# Patient Record
Sex: FEMALE | Race: WHITE | NOT HISPANIC OR LATINO | URBAN - METROPOLITAN AREA
[De-identification: names, ages, dates, MRNs, and addresses within clinical notes are randomized per-mention and may not be internally consistent; named-entity substitution may affect disease eponyms.]

---

## 2023-08-18 ENCOUNTER — HOSPITAL ENCOUNTER (INPATIENT)
Facility: HOSPITAL | Age: 23
LOS: 6 days | Discharge: HOME | End: 2023-08-24
Attending: OBSTETRICS & GYNECOLOGY | Admitting: OBSTETRICS & GYNECOLOGY
Payer: COMMERCIAL

## 2023-08-18 ENCOUNTER — PREP FOR SURGERY (OUTPATIENT)
Dept: OTHER | Facility: HOSPITAL | Age: 23
End: 2023-08-18
Payer: COMMERCIAL

## 2023-08-18 DIAGNOSIS — Z98.891 S/P PRIMARY LOW TRANSVERSE C-SECTION: Primary | ICD-10-CM

## 2023-08-18 DIAGNOSIS — O13.3 GESTATIONAL HYPERTENSION, THIRD TRIMESTER: Primary | ICD-10-CM

## 2023-08-18 DIAGNOSIS — O13.3 GESTATIONAL HYPERTENSION, THIRD TRIMESTER: ICD-10-CM

## 2023-08-18 PROBLEM — Z34.93 THIRD TRIMESTER PREGNANCY: Status: ACTIVE | Noted: 2023-08-18

## 2023-08-18 LAB
ABO GROUP BLD: NORMAL
ALP SERPL-CCNC: 165 U/L (ref 39–117)
ALT SERPL W P-5'-P-CCNC: 6 U/L (ref 1–33)
AST SERPL-CCNC: 12 U/L (ref 1–32)
BILIRUB SERPL-MCNC: 0.2 MG/DL (ref 0–1.2)
BLD GP AB SCN SERPL QL: NEGATIVE
CREAT SERPL-MCNC: 0.44 MG/DL (ref 0.57–1)
DEPRECATED RDW RBC AUTO: 41.7 FL (ref 37–54)
ERYTHROCYTE [DISTWIDTH] IN BLOOD BY AUTOMATED COUNT: 13.6 % (ref 12.3–15.4)
EXPIRATION DATE: ABNORMAL
HCT VFR BLD AUTO: 36.2 % (ref 34–46.6)
HGB BLD-MCNC: 12.2 G/DL (ref 12–15.9)
LDH SERPL-CCNC: 159 U/L (ref 135–214)
Lab: ABNORMAL
MCH RBC QN AUTO: 28.6 PG (ref 26.6–33)
MCHC RBC AUTO-ENTMCNC: 33.7 G/DL (ref 31.5–35.7)
MCV RBC AUTO: 84.8 FL (ref 79–97)
PLATELET # BLD AUTO: 264 10*3/MM3 (ref 140–450)
PMV BLD AUTO: 10.7 FL (ref 6–12)
PROT UR STRIP-MCNC: ABNORMAL MG/DL
RBC # BLD AUTO: 4.27 10*6/MM3 (ref 3.77–5.28)
RH BLD: POSITIVE
T&S EXPIRATION DATE: NORMAL
URATE SERPL-MCNC: 4.6 MG/DL (ref 2.4–5.7)
WBC NRBC COR # BLD: 15.45 10*3/MM3 (ref 3.4–10.8)

## 2023-08-18 PROCEDURE — 85027 COMPLETE CBC AUTOMATED: CPT | Performed by: OBSTETRICS & GYNECOLOGY

## 2023-08-18 PROCEDURE — 81002 URINALYSIS NONAUTO W/O SCOPE: CPT | Performed by: OBSTETRICS & GYNECOLOGY

## 2023-08-18 PROCEDURE — 82247 BILIRUBIN TOTAL: CPT | Performed by: OBSTETRICS & GYNECOLOGY

## 2023-08-18 PROCEDURE — 84550 ASSAY OF BLOOD/URIC ACID: CPT | Performed by: OBSTETRICS & GYNECOLOGY

## 2023-08-18 PROCEDURE — 86900 BLOOD TYPING SEROLOGIC ABO: CPT | Performed by: OBSTETRICS & GYNECOLOGY

## 2023-08-18 PROCEDURE — 82565 ASSAY OF CREATININE: CPT | Performed by: OBSTETRICS & GYNECOLOGY

## 2023-08-18 PROCEDURE — G0378 HOSPITAL OBSERVATION PER HR: HCPCS

## 2023-08-18 PROCEDURE — 86901 BLOOD TYPING SEROLOGIC RH(D): CPT | Performed by: OBSTETRICS & GYNECOLOGY

## 2023-08-18 PROCEDURE — 87081 CULTURE SCREEN ONLY: CPT | Performed by: OBSTETRICS & GYNECOLOGY

## 2023-08-18 PROCEDURE — 84075 ASSAY ALKALINE PHOSPHATASE: CPT | Performed by: OBSTETRICS & GYNECOLOGY

## 2023-08-18 PROCEDURE — 84450 TRANSFERASE (AST) (SGOT): CPT | Performed by: OBSTETRICS & GYNECOLOGY

## 2023-08-18 PROCEDURE — 84460 ALANINE AMINO (ALT) (SGPT): CPT | Performed by: OBSTETRICS & GYNECOLOGY

## 2023-08-18 PROCEDURE — 86850 RBC ANTIBODY SCREEN: CPT | Performed by: OBSTETRICS & GYNECOLOGY

## 2023-08-18 PROCEDURE — 83615 LACTATE (LD) (LDH) ENZYME: CPT | Performed by: OBSTETRICS & GYNECOLOGY

## 2023-08-18 RX ORDER — CITRIC ACID/SODIUM CITRATE 334-500MG
15 SOLUTION, ORAL ORAL ONCE
Status: DISCONTINUED | OUTPATIENT
Start: 2023-08-18 | End: 2023-08-20 | Stop reason: SDUPTHER

## 2023-08-18 RX ORDER — SODIUM CHLORIDE 0.9 % (FLUSH) 0.9 %
10 SYRINGE (ML) INJECTION AS NEEDED
Status: CANCELLED | OUTPATIENT
Start: 2023-08-18

## 2023-08-18 RX ORDER — ONDANSETRON 4 MG/1
4 TABLET, FILM COATED ORAL EVERY 8 HOURS PRN
Status: CANCELLED | OUTPATIENT
Start: 2023-08-18

## 2023-08-18 RX ORDER — NIFEDIPINE 30 MG/1
30 TABLET, EXTENDED RELEASE ORAL
Status: CANCELLED | OUTPATIENT
Start: 2023-08-18

## 2023-08-18 RX ORDER — DIPHENHYDRAMINE HCL 25 MG
25 CAPSULE ORAL EVERY 4 HOURS PRN
Status: CANCELLED | OUTPATIENT
Start: 2023-08-18

## 2023-08-18 RX ORDER — DIPHENHYDRAMINE HCL 25 MG
25 CAPSULE ORAL EVERY 4 HOURS PRN
Status: DISCONTINUED | OUTPATIENT
Start: 2023-08-18 | End: 2023-08-21 | Stop reason: SDUPTHER

## 2023-08-18 RX ORDER — SODIUM CHLORIDE 9 MG/ML
40 INJECTION, SOLUTION INTRAVENOUS AS NEEDED
Status: DISCONTINUED | OUTPATIENT
Start: 2023-08-18 | End: 2023-08-23

## 2023-08-18 RX ORDER — LIDOCAINE HYDROCHLORIDE 10 MG/ML
0.5 INJECTION, SOLUTION EPIDURAL; INFILTRATION; INTRACAUDAL; PERINEURAL ONCE AS NEEDED
Status: DISCONTINUED | OUTPATIENT
Start: 2023-08-18 | End: 2023-08-24

## 2023-08-18 RX ORDER — DIPHENHYDRAMINE HYDROCHLORIDE 50 MG/ML
25 INJECTION INTRAMUSCULAR; INTRAVENOUS EVERY 4 HOURS PRN
Status: CANCELLED | OUTPATIENT
Start: 2023-08-18

## 2023-08-18 RX ORDER — NIFEDIPINE 10 MG/1
10 CAPSULE ORAL ONCE
Status: DISCONTINUED | OUTPATIENT
Start: 2023-08-18 | End: 2023-08-18

## 2023-08-18 RX ORDER — NIFEDIPINE 10 MG/1
10 CAPSULE ORAL ONCE
Status: COMPLETED | OUTPATIENT
Start: 2023-08-18 | End: 2023-08-18

## 2023-08-18 RX ORDER — ALUMINA, MAGNESIA, AND SIMETHICONE 2400; 2400; 240 MG/30ML; MG/30ML; MG/30ML
15 SUSPENSION ORAL EVERY 6 HOURS PRN
Status: CANCELLED | OUTPATIENT
Start: 2023-08-18

## 2023-08-18 RX ORDER — ACETAMINOPHEN 325 MG/1
650 TABLET ORAL EVERY 4 HOURS PRN
Status: CANCELLED | OUTPATIENT
Start: 2023-08-18

## 2023-08-18 RX ORDER — FAMOTIDINE 10 MG
10 TABLET ORAL DAILY
COMMUNITY

## 2023-08-18 RX ORDER — ONDANSETRON 2 MG/ML
4 INJECTION INTRAMUSCULAR; INTRAVENOUS EVERY 8 HOURS PRN
Status: CANCELLED | OUTPATIENT
Start: 2023-08-18

## 2023-08-18 RX ORDER — NIFEDIPINE 30 MG/1
30 TABLET, EXTENDED RELEASE ORAL EVERY 12 HOURS
Qty: 60 TABLET | Refills: 1 | Status: SHIPPED | OUTPATIENT
Start: 2023-08-18 | End: 2023-08-24 | Stop reason: SDUPTHER

## 2023-08-18 RX ORDER — LIDOCAINE HYDROCHLORIDE 10 MG/ML
0.5 INJECTION, SOLUTION EPIDURAL; INFILTRATION; INTRACAUDAL; PERINEURAL ONCE AS NEEDED
Status: CANCELLED | OUTPATIENT
Start: 2023-08-18

## 2023-08-18 RX ORDER — ONDANSETRON 4 MG/1
4 TABLET, FILM COATED ORAL EVERY 8 HOURS PRN
Status: DISCONTINUED | OUTPATIENT
Start: 2023-08-18 | End: 2023-08-21 | Stop reason: SDUPTHER

## 2023-08-18 RX ORDER — ONDANSETRON 2 MG/ML
4 INJECTION INTRAMUSCULAR; INTRAVENOUS EVERY 8 HOURS PRN
Status: DISCONTINUED | OUTPATIENT
Start: 2023-08-18 | End: 2023-08-21 | Stop reason: SDUPTHER

## 2023-08-18 RX ORDER — NIFEDIPINE 30 MG/1
30 TABLET, EXTENDED RELEASE ORAL NIGHTLY
Status: DISCONTINUED | OUTPATIENT
Start: 2023-08-18 | End: 2023-08-19

## 2023-08-18 RX ORDER — SODIUM CHLORIDE 0.9 % (FLUSH) 0.9 %
10 SYRINGE (ML) INJECTION EVERY 12 HOURS SCHEDULED
Status: CANCELLED | OUTPATIENT
Start: 2023-08-18

## 2023-08-18 RX ORDER — PROMETHAZINE HYDROCHLORIDE 12.5 MG/1
12.5 TABLET ORAL EVERY 6 HOURS PRN
Status: CANCELLED | OUTPATIENT
Start: 2023-08-18

## 2023-08-18 RX ORDER — SODIUM CHLORIDE 0.9 % (FLUSH) 0.9 %
10 SYRINGE (ML) INJECTION AS NEEDED
Status: DISCONTINUED | OUTPATIENT
Start: 2023-08-18 | End: 2023-08-23

## 2023-08-18 RX ORDER — PROMETHAZINE HYDROCHLORIDE 12.5 MG/1
12.5 TABLET ORAL EVERY 6 HOURS PRN
Status: DISCONTINUED | OUTPATIENT
Start: 2023-08-18 | End: 2023-08-24

## 2023-08-18 RX ORDER — ACETAMINOPHEN 325 MG/1
650 TABLET ORAL EVERY 4 HOURS PRN
Status: DISCONTINUED | OUTPATIENT
Start: 2023-08-18 | End: 2023-08-21 | Stop reason: SDUPTHER

## 2023-08-18 RX ORDER — PROMETHAZINE HYDROCHLORIDE 12.5 MG/1
12.5 SUPPOSITORY RECTAL EVERY 6 HOURS PRN
Status: DISCONTINUED | OUTPATIENT
Start: 2023-08-18 | End: 2023-08-24

## 2023-08-18 RX ORDER — SODIUM CHLORIDE 9 MG/ML
40 INJECTION, SOLUTION INTRAVENOUS AS NEEDED
Status: CANCELLED | OUTPATIENT
Start: 2023-08-18

## 2023-08-18 RX ORDER — ALUMINA, MAGNESIA, AND SIMETHICONE 2400; 2400; 240 MG/30ML; MG/30ML; MG/30ML
15 SUSPENSION ORAL EVERY 6 HOURS PRN
Status: DISCONTINUED | OUTPATIENT
Start: 2023-08-18 | End: 2023-08-21 | Stop reason: SDUPTHER

## 2023-08-18 RX ORDER — SODIUM CHLORIDE 0.9 % (FLUSH) 0.9 %
10 SYRINGE (ML) INJECTION EVERY 12 HOURS SCHEDULED
Status: DISCONTINUED | OUTPATIENT
Start: 2023-08-18 | End: 2023-08-24

## 2023-08-18 RX ORDER — DIPHENHYDRAMINE HYDROCHLORIDE 50 MG/ML
25 INJECTION INTRAMUSCULAR; INTRAVENOUS EVERY 4 HOURS PRN
Status: DISCONTINUED | OUTPATIENT
Start: 2023-08-18 | End: 2023-08-21 | Stop reason: SDUPTHER

## 2023-08-18 RX ORDER — TRISODIUM CITRATE DIHYDRATE AND CITRIC ACID MONOHYDRATE 500; 334 MG/5ML; MG/5ML
15 SOLUTION ORAL ONCE
Status: CANCELLED | OUTPATIENT
Start: 2023-08-18 | End: 2023-08-18

## 2023-08-18 RX ORDER — PROMETHAZINE HYDROCHLORIDE 12.5 MG/1
12.5 SUPPOSITORY RECTAL EVERY 6 HOURS PRN
Status: CANCELLED | OUTPATIENT
Start: 2023-08-18

## 2023-08-18 RX ADMIN — ACETAMINOPHEN 650 MG: 325 TABLET ORAL at 18:22

## 2023-08-18 RX ADMIN — NIFEDIPINE 10 MG: 10 CAPSULE ORAL at 13:24

## 2023-08-18 RX ADMIN — NIFEDIPINE 30 MG: 30 TABLET, EXTENDED RELEASE ORAL at 20:58

## 2023-08-18 NOTE — NURSING NOTE
dR REYNA CALLED RN BACK AND IS GOING TO CALL PATIENT TO GO PACK A BAG AND COME BACK TO THE HOSPITAL FOR ADMISSION ON APU FOR THE WEEKEND

## 2023-08-18 NOTE — NURSING NOTE
PATIENT GIVEN DISCHARGE INSTRUCTIONS.  PATIENT GIVEN LABOR S/S, RETURN TO  IF HA UNRELIEVED BY TYLENOL, BLURRY VISION, PAIN UNDER RIGHT BREAST, INCREASE IN SWELLING OR /110.  PT ALSO INSTRUCTED TO TAKE A 24 HOUR URINE HOME AND TO COMPLETE PRIOR TO APPOINTMENT SET UP ON MONDAY AT 1130 .  PATIENT VERBALIZES UNDERSTANDING.  PATIENT AMBULATORY TO PRIVATE VEHICLE.

## 2023-08-18 NOTE — H&P
JASON Maguire  Obstetric History and Physical Triage    CC: elevated BP    SUBJECTIVE:     Patient is a 23 y.o. female  currently at 35w3d, who presents with elevated BP from the office. Mild range pressures in office and at home over the past week. Has noticed some edema, but otherwise feels well. No HA, abdominal pain or vis changes.   + FM no VB or LOF  Sent to triage for labs and serial BP.      Prenatal Information:  Prenatal Results       Initial Prenatal Labs       Test Value Reference Range Date Time    Hemoglobin  14.3 g/dL 12.0 - 15.9 23 1442    Hematocrit  41.6 % 34.0 - 46.6 23 1442    Platelets  312 10*3/mm3 140 - 450 23 1442    Rubella IgG  10.40 index Immune >0.99 23 1442    Hepatitis B SAg  Non-Reactive  Non-Reactive 23 1442    Hepatitis C Ab  Non-Reactive  Non-Reactive 23 1442    RPR        T. Pallidum Ab         ABO  A   23 1442    Rh  Positive   23 1442    Antibody Screen  Negative   23 1442    HIV  Non-Reactive  Non-Reactive 23 1442    Urine Culture  Staphylococcus epidermidis   23 1442    Gonorrhea        Chlamydia        TSH        HgB A1c         Varicella IgG        HgB Electrophoresis         Cystic fibrosis                   Fetal testing        Test Value Reference Range Date Time    NIPT        MSAFP        AFP-4                  2nd and 3rd Trimester       Test Value Reference Range Date Time    Hemoglobin (repeated)  12.2 g/dL 12.0 - 15.9 23 1150       11.4 g/dL 12.0 - 15.9 23 1303    Hematocrit (repeated)  36.2 % 34.0 - 46.6 23 1150       33.4 % 34.0 - 46.6 23 1303    Platelets   264 10*3/mm3 140 - 450 23 1150       288 10*3/mm3 140 - 450 23 1303       312 10*3/mm3 140 - 450 23 1442    GCT  120 mg/dL 65 - 139 23 1303    Antibody Screen (repeated)  Negative   23 1303       Negative   23 1442    GTT Fasting        GTT 1 Hr        GTT 2 Hr        GTT 3 Hr         Group B Strep                  Other testing        Test Value Reference Range Date Time    Parvo IgG         CMV IgG                   Drug Screening       Test Value Reference Range Date Time    Amphetamine Screen  Negative  Negative 03/16/23 1442    Barbiturate Screen  Negative  Negative 03/16/23 1442    Benzodiazepine Screen  Negative  Negative 03/16/23 1442    Methadone Screen  Negative  Negative 03/16/23 1442    Phencyclidine Screen  Negative  Negative 03/16/23 1442    Opiates Screen  Negative  Negative 03/16/23 1442    THC Screen  Negative  Negative 03/16/23 1442    Cocaine Screen  Negative  Negative 03/16/23 1442    Propoxyphene Screen  Negative  Negative 03/16/23 1442    Buprenorphine Screen  Negative  Negative 03/16/23 1442    Methamphetamine Screen  Negative  Negative 03/16/23 1442    Oxycodone Screen  Negative  Negative 03/16/23 1442    Tricyclic Antidepressants Screen  Negative  Negative 03/16/23 1442              Legend    ^: Historical                          External Prenatal Results       Pregnancy Outside Results - Transcribed From Office Records - See Scanned Records For Details       Test Value Date Time    ABO  A  03/16/23 1442    Rh  Positive  03/16/23 1442    Antibody Screen  Negative  06/05/23 1303       Negative  03/16/23 1442    Varicella IgG       Rubella  10.40 index 03/16/23 1442    Hgb  12.2 g/dL 08/18/23 1150       11.4 g/dL 06/05/23 1303       14.3 g/dL 03/16/23 1442    Hct  36.2 % 08/18/23 1150       33.4 % 06/05/23 1303       41.6 % 03/16/23 1442    Glucose Fasting GTT       Glucose Tolerance Test 1 hour       Glucose Tolerance Test 3 hour       Gonorrhea (discrete)       Chlamydia (discrete)       RPR       VDRL       Syphilis Antibody       HBsAg  Non-Reactive  03/16/23 1442    Herpes Simplex Virus PCR       Herpes Simplex VIrus Culture       HIV  Non-Reactive  03/16/23 1442    Hep C RNA Quant PCR       Hep C Antibody  Non-Reactive  03/16/23 1442    AFP       Group B Strep        GBS Susceptibility to Clindamycin       GBS Susceptibility to Erythromycin       Fetal Fibronectin       Genetic Testing, Maternal Blood                 Drug Screening       Test Value Date Time    Urine Drug Screen       Amphetamine Screen  Negative  23 1442    Barbiturate Screen  Negative  23 1442    Benzodiazepine Screen  Negative  23 1442    Methadone Screen  Negative  23 1442    Phencyclidine Screen  Negative  23 1442    Opiates Screen  Negative  23 1442    THC Screen  Negative  23 1442    Cocaine Screen       Propoxyphene Screen  Negative  23 1442    Buprenorphine Screen  Negative  23 1442    Methamphetamine Screen       Oxycodone Screen  Negative  23 1442    Tricyclic Antidepressants Screen  Negative  23 1442              Legend    ^: Historical                             OB History:                     OB History    Para Term  AB Living   2 0 0 0 1 0   SAB IAB Ectopic Molar Multiple Live Births   1 0 0 0 0 0      # Outcome Date GA Lbr Angelo/2nd Weight Sex Delivery Anes PTL Lv   2 Current            1 SAB  9w0d    SAB         Allergies:                        Allergies   Allergen Reactions    Penicillins Anaphylaxis      Past Medical History: Past Medical History:   Diagnosis Date    ADHD       Past Surgical History No past surgical history on file.   Family History: No family history on file.   Social History:  reports that she has never smoked. She has never used smokeless tobacco.   reports no history of alcohol use.   reports no history of drug use.    General ROS: Pertinent items are noted in HPI, all other systems reviewed and negative   Medications: amphetamine-dextroamphetamine, amphetamine-dextroamphetamine XR, cetirizine, famotidine, and prenatal vitamin 27-0.8       Objective       Vital Signs Range for the last 24 hours  Temperature: Temp:  [98 øF (36.7 øC)] 98 øF (36.7 øC)   BP: BP: (142)/(102) 142/102   Pulse:  Heart Rate:  [96] 96   Respirations: Resp:  [18] 18   SPO2: SpO2:  [96 %] 96 %               Physical Examination: General appearance - alert, well appearing, and in no distress  Chest - clear to auscultation, no wheezes, rales or rhonchi, symmetric air entry  Heart - normal rate, regular rhythm, normal S1, S2, no murmurs, rubs, clicks or gallops  Abdomen - Soft, gravid, nontender  Extremities - pedal edema 2 +        Fetal Heart Rate Assessment           Baseline:  130   Variability:  mod   Accels:  presetn   Decels:  absent   Tracing Category:  1     Uterine Assessment   Method:     Frequency (min):     Ctx Count in 10 min:  none     Laboratory Results:  WBC   Date Value Ref Range Status   08/18/2023 15.45 (H) 3.40 - 10.80 10*3/mm3 Final     RBC   Date Value Ref Range Status   08/18/2023 4.27 3.77 - 5.28 10*6/mm3 Final     Hemoglobin   Date Value Ref Range Status   08/18/2023 12.2 12.0 - 15.9 g/dL Final     Hematocrit   Date Value Ref Range Status   08/18/2023 36.2 34.0 - 46.6 % Final     MCV   Date Value Ref Range Status   08/18/2023 84.8 79.0 - 97.0 fL Final     MCH   Date Value Ref Range Status   08/18/2023 28.6 26.6 - 33.0 pg Final     MCHC   Date Value Ref Range Status   08/18/2023 33.7 31.5 - 35.7 g/dL Final     RDW   Date Value Ref Range Status   08/18/2023 13.6 12.3 - 15.4 % Final     RDW-SD   Date Value Ref Range Status   08/18/2023 41.7 37.0 - 54.0 fl Final     MPV   Date Value Ref Range Status   08/18/2023 10.7 6.0 - 12.0 fL Final     Platelets   Date Value Ref Range Status   08/18/2023 264 140 - 450 10*3/mm3 Final   CMP:        Lab 08/18/23  1150   CREATININE 0.44*   ALT (SGPT) 6   AST (SGOT) 12   BILIRUBIN 0.2   ALK PHOS 165*      Uric acid 4.6      Assessment/Plan:   IUP 35w3d with GHTN    1.GHTN: largely high mild range pressures, with normal labs and no other sx. Will start nifedipine 30 XL bid. And plan 24 hr UP    2.recommend bed rest, letter given to stop work     3.FWB reassuring.     4.  Pt given 10 mg Nifedipine po. Will allow her to go home and pack a bag for the weekend. Will come back to be re-admitted for observation/24 hr UP on APU later today.       Connie Nguyen MD  8/18/2023  12:29 EDT

## 2023-08-19 PROBLEM — O13.9 GESTATIONAL HYPERTENSION: Status: ACTIVE | Noted: 2023-08-19

## 2023-08-19 PROBLEM — O14.13 PREECLAMPSIA, SEVERE, THIRD TRIMESTER: Status: ACTIVE | Noted: 2023-08-19

## 2023-08-19 LAB
BASOPHILS # BLD AUTO: 0.05 10*3/MM3 (ref 0–0.2)
BASOPHILS NFR BLD AUTO: 0.4 % (ref 0–1.5)
DEPRECATED RDW RBC AUTO: 41.7 FL (ref 37–54)
EOSINOPHIL # BLD AUTO: 0.4 10*3/MM3 (ref 0–0.4)
EOSINOPHIL NFR BLD AUTO: 3.1 % (ref 0.3–6.2)
ERYTHROCYTE [DISTWIDTH] IN BLOOD BY AUTOMATED COUNT: 13.7 % (ref 12.3–15.4)
HCT VFR BLD AUTO: 35.9 % (ref 34–46.6)
HGB BLD-MCNC: 11.9 G/DL (ref 12–15.9)
IMM GRANULOCYTES # BLD AUTO: 0.06 10*3/MM3 (ref 0–0.05)
IMM GRANULOCYTES NFR BLD AUTO: 0.5 % (ref 0–0.5)
LYMPHOCYTES # BLD AUTO: 2.04 10*3/MM3 (ref 0.7–3.1)
LYMPHOCYTES NFR BLD AUTO: 16.1 % (ref 19.6–45.3)
MCH RBC QN AUTO: 28.3 PG (ref 26.6–33)
MCHC RBC AUTO-ENTMCNC: 33.1 G/DL (ref 31.5–35.7)
MCV RBC AUTO: 85.3 FL (ref 79–97)
MONOCYTES # BLD AUTO: 0.59 10*3/MM3 (ref 0.1–0.9)
MONOCYTES NFR BLD AUTO: 4.6 % (ref 5–12)
NEUTROPHILS NFR BLD AUTO: 75.3 % (ref 42.7–76)
NEUTROPHILS NFR BLD AUTO: 9.56 10*3/MM3 (ref 1.7–7)
NRBC BLD AUTO-RTO: 0 /100 WBC (ref 0–0.2)
PLATELET # BLD AUTO: 248 10*3/MM3 (ref 140–450)
PMV BLD AUTO: 10.5 FL (ref 6–12)
RBC # BLD AUTO: 4.21 10*6/MM3 (ref 3.77–5.28)
WBC NRBC COR # BLD: 12.7 10*3/MM3 (ref 3.4–10.8)

## 2023-08-19 PROCEDURE — 0 MAGNESIUM SULFATE 20 GM/500ML SOLUTION: Performed by: OBSTETRICS & GYNECOLOGY

## 2023-08-19 PROCEDURE — 25010000002 VANCOMYCIN 10 G RECONSTITUTED SOLUTION

## 2023-08-19 PROCEDURE — 25010000002 FENTANYL CITRATE (PF) 50 MCG/ML SOLUTION: Performed by: OBSTETRICS & GYNECOLOGY

## 2023-08-19 PROCEDURE — 25010000002 LABETALOL 5 MG/ML SOLUTION: Performed by: OBSTETRICS & GYNECOLOGY

## 2023-08-19 PROCEDURE — 59200 INSERT CERVICAL DILATOR: CPT | Performed by: OBSTETRICS & GYNECOLOGY

## 2023-08-19 PROCEDURE — 3E033VJ INTRODUCTION OF OTHER HORMONE INTO PERIPHERAL VEIN, PERCUTANEOUS APPROACH: ICD-10-PCS | Performed by: OBSTETRICS & GYNECOLOGY

## 2023-08-19 PROCEDURE — 85025 COMPLETE CBC W/AUTO DIFF WBC: CPT | Performed by: OBSTETRICS & GYNECOLOGY

## 2023-08-19 PROCEDURE — 59025 FETAL NON-STRESS TEST: CPT

## 2023-08-19 RX ORDER — BUTALBITAL, ACETAMINOPHEN AND CAFFEINE 50; 325; 40 MG/1; MG/1; MG/1
2 TABLET ORAL ONCE
Status: COMPLETED | OUTPATIENT
Start: 2023-08-19 | End: 2023-08-19

## 2023-08-19 RX ORDER — SODIUM CHLORIDE 0.9 % (FLUSH) 0.9 %
10 SYRINGE (ML) INJECTION AS NEEDED
Status: DISCONTINUED | OUTPATIENT
Start: 2023-08-19 | End: 2023-08-23

## 2023-08-19 RX ORDER — LIDOCAINE HYDROCHLORIDE 10 MG/ML
0.5 INJECTION, SOLUTION EPIDURAL; INFILTRATION; INTRACAUDAL; PERINEURAL ONCE AS NEEDED
Status: DISCONTINUED | OUTPATIENT
Start: 2023-08-19 | End: 2023-08-24

## 2023-08-19 RX ORDER — OXYTOCIN/0.9 % SODIUM CHLORIDE 30/500 ML
2-30 PLASTIC BAG, INJECTION (ML) INTRAVENOUS
Status: DISCONTINUED | OUTPATIENT
Start: 2023-08-19 | End: 2023-08-24

## 2023-08-19 RX ORDER — NIFEDIPINE 10 MG/1
10 CAPSULE ORAL ONCE
Status: COMPLETED | OUTPATIENT
Start: 2023-08-19 | End: 2023-08-19

## 2023-08-19 RX ORDER — NIFEDIPINE 30 MG/1
30 TABLET, EXTENDED RELEASE ORAL 2 TIMES DAILY
Status: DISCONTINUED | OUTPATIENT
Start: 2023-08-19 | End: 2023-08-22

## 2023-08-19 RX ORDER — NIFEDIPINE 10 MG/1
10-20 CAPSULE ORAL
Status: DISCONTINUED | OUTPATIENT
Start: 2023-08-19 | End: 2023-08-24

## 2023-08-19 RX ORDER — HYDRALAZINE HYDROCHLORIDE 20 MG/ML
5-10 INJECTION INTRAMUSCULAR; INTRAVENOUS
Status: DISCONTINUED | OUTPATIENT
Start: 2023-08-19 | End: 2023-08-24

## 2023-08-19 RX ORDER — SODIUM CHLORIDE, SODIUM LACTATE, POTASSIUM CHLORIDE, CALCIUM CHLORIDE 600; 310; 30; 20 MG/100ML; MG/100ML; MG/100ML; MG/100ML
125 INJECTION, SOLUTION INTRAVENOUS CONTINUOUS
Status: DISCONTINUED | OUTPATIENT
Start: 2023-08-19 | End: 2023-08-24

## 2023-08-19 RX ORDER — SODIUM CHLORIDE 0.9 % (FLUSH) 0.9 %
10 SYRINGE (ML) INJECTION EVERY 12 HOURS SCHEDULED
Status: DISCONTINUED | OUTPATIENT
Start: 2023-08-19 | End: 2023-08-24

## 2023-08-19 RX ORDER — SODIUM CHLORIDE 9 MG/ML
40 INJECTION, SOLUTION INTRAVENOUS AS NEEDED
Status: DISCONTINUED | OUTPATIENT
Start: 2023-08-19 | End: 2023-08-23

## 2023-08-19 RX ORDER — LABETALOL HYDROCHLORIDE 5 MG/ML
20-80 INJECTION, SOLUTION INTRAVENOUS
Status: DISCONTINUED | OUTPATIENT
Start: 2023-08-19 | End: 2023-08-21 | Stop reason: SDUPTHER

## 2023-08-19 RX ORDER — VANCOMYCIN 2 GRAM/500 ML IN 0.9 % SODIUM CHLORIDE INTRAVENOUS
2000 EVERY 12 HOURS
Status: DISCONTINUED | OUTPATIENT
Start: 2023-08-19 | End: 2023-08-20

## 2023-08-19 RX ORDER — MAGNESIUM SULFATE HEPTAHYDRATE 40 MG/ML
2 INJECTION, SOLUTION INTRAVENOUS CONTINUOUS
Status: DISCONTINUED | OUTPATIENT
Start: 2023-08-19 | End: 2023-08-21

## 2023-08-19 RX ORDER — FENTANYL CITRATE 50 UG/ML
50 INJECTION, SOLUTION INTRAMUSCULAR; INTRAVENOUS
Status: DISCONTINUED | OUTPATIENT
Start: 2023-08-19 | End: 2023-08-24

## 2023-08-19 RX ADMIN — ACETAMINOPHEN 650 MG: 325 TABLET ORAL at 13:10

## 2023-08-19 RX ADMIN — NIFEDIPINE 10 MG: 10 CAPSULE ORAL at 13:10

## 2023-08-19 RX ADMIN — MAGNESIUM SULFATE HEPTAHYDRATE 2 G/HR: 40 INJECTION, SOLUTION INTRAVENOUS at 21:58

## 2023-08-19 RX ADMIN — BUTALBITAL, ACETAMINOPHEN, AND CAFFEINE 2 TABLET: 50; 325; 40 TABLET ORAL at 16:06

## 2023-08-19 RX ADMIN — Medication 20 MG: at 20:15

## 2023-08-19 RX ADMIN — VANCOMYCIN HYDROCHLORIDE 2000 MG: 10 INJECTION, POWDER, LYOPHILIZED, FOR SOLUTION INTRAVENOUS at 17:29

## 2023-08-19 RX ADMIN — ACETAMINOPHEN 650 MG: 325 TABLET ORAL at 02:32

## 2023-08-19 RX ADMIN — FENTANYL CITRATE 50 MCG: 50 INJECTION, SOLUTION INTRAMUSCULAR; INTRAVENOUS at 23:24

## 2023-08-19 RX ADMIN — NIFEDIPINE 30 MG: 30 TABLET, EXTENDED RELEASE ORAL at 21:05

## 2023-08-19 RX ADMIN — SODIUM CHLORIDE, POTASSIUM CHLORIDE, SODIUM LACTATE AND CALCIUM CHLORIDE 125 ML/HR: 600; 310; 30; 20 INJECTION, SOLUTION INTRAVENOUS at 15:27

## 2023-08-19 RX ADMIN — Medication 2 MILLI-UNITS/MIN: at 22:01

## 2023-08-19 RX ADMIN — Medication 20 MG: at 16:39

## 2023-08-19 NOTE — PROGRESS NOTES
Andrez CHOWDARY  : 2000  MRN: [id number]  CSN: [id number]    Hospital Day: 2    CC: hospital follow-up for Pre-eclampsia     Antepartum Progress Note    Subjective   Barbie currently denies a headache, but has had then the last couple of days.  She denies current vision changes and RUQ pain.    Denies chest pain and SOA.               Objective     Min/max vitals past 24 hours:   Temp  Min: 97.9 øF (36.6 øC)  Max: 98.9 øF (37.2 øC)  BP  Min: 139/95  Max: 173/102  Pulse  Min: 78  Max: 103  Resp  Min: 16  Max: 18         General: well developed; well nourished   Heart: regular rate and rhythm   Lungs: breathing is unlabored  clear to auscultation bilaterally   Abdomen: soft, non-tender; no masses  Normal findings: bowel sounds normal   FHT's: reassuring and category 1   Cervix: was not checked.   Contractions: none          Assessment   IUP at 35w4d  Severe pre-eclampsia      Plan   Barbie has had severe sever range blood pressures in the last 24 hours ruling her in for severe pre-eclampsia.     Will move to labour almodovar and initiate magnesium for seizure prophylaxis.  Antihypertensives prn  Insert cervical witt balloon.  Pitocin per on-call physician.   ABX for GBS unknown               Kenny Trevino MD  2023  13:54 EDT

## 2023-08-19 NOTE — PROGRESS NOTES
"08/19/23  17:48 EDT  Barbie STACKTON    SUBJECTIVE: Pt moved to LD to begin IOL due to persistent severe range BPs. Pt was discussed with MFM per laborist who recommended delivery. FB not yet placed, tolerating magnesium well.     ASSESSMENTS:     /84   Pulse 88   Temp 98.2 øF (36.8 øC) (Oral)   Resp 16   Ht 162.6 cm (64\")   Wt 113 kg (249 lb)   SpO2 97%   BMI 42.74 kg/mý     Fetal Heart Rate Assessment   Method: Fetal HR Assessment Method: external   Beats/min: Fetal HR (beats/min): 140   Baseline: Fetal HR Baseline: normal range   Varibility: Fetal HR Variability: moderate (amplitude range 6 to 25 bpm)   Accels: Fetal HR Accelerations: greater than/equal to 15 bpm, lasting at least 15 seconds   Decels: Fetal HR Decelerations: absent   Tracing Category:  I     Uterine Assessment   Method: Method: external tocotransducer   Frequency (min): Contraction Frequency (Minutes): x1   Ctx Count in 10 min:     Duration:     Intensity: Contraction Intensity: no contractions   Intensity by IUPC:     Resting Tone: Uterine Resting Tone: soft by palpation   Resting Tone by IUPC:            PLAN: Will begin IOL with FB for cervical ripening in the setting of pre-eclampsia with severe features. Plan for low dose pitocin overnight, followed by pitocin per protocol at 0500 tomorrow. Vancomycin for GBS ppx given GBS status unknown, prematurity, and PCN allergy (anaphylaxis). S/p magnesium bolus of 4g, continue magnesium infusion at 2g/hr throughout IOL and for 24h PP for seizure prophylaxis. Labetalol protocol PRN.    Kimberly Cervantes MD  17:48 EDT  08/19/23    "

## 2023-08-20 ENCOUNTER — ANESTHESIA EVENT (OUTPATIENT)
Dept: LABOR AND DELIVERY | Facility: HOSPITAL | Age: 23
End: 2023-08-20
Payer: COMMERCIAL

## 2023-08-20 ENCOUNTER — ANESTHESIA (OUTPATIENT)
Dept: LABOR AND DELIVERY | Facility: HOSPITAL | Age: 23
End: 2023-08-20
Payer: COMMERCIAL

## 2023-08-20 LAB — BACTERIA SPEC AEROBE CULT: NORMAL

## 2023-08-20 PROCEDURE — 51703 INSERT BLADDER CATH COMPLEX: CPT

## 2023-08-20 PROCEDURE — 25010000002 BUPIVACAINE (PF) 0.5 % SOLUTION: Performed by: ANESTHESIOLOGY

## 2023-08-20 PROCEDURE — 25010000002 VANCOMYCIN 10 G RECONSTITUTED SOLUTION

## 2023-08-20 PROCEDURE — 0 MAGNESIUM SULFATE 20 GM/500ML SOLUTION: Performed by: OBSTETRICS & GYNECOLOGY

## 2023-08-20 PROCEDURE — C1755 CATHETER, INTRASPINAL: HCPCS | Performed by: ANESTHESIOLOGY

## 2023-08-20 PROCEDURE — 25010000002 FENTANYL CITRATE (PF) 50 MCG/ML SOLUTION: Performed by: ANESTHESIOLOGY

## 2023-08-20 PROCEDURE — 25010000002 ONDANSETRON PER 1 MG: Performed by: OBSTETRICS & GYNECOLOGY

## 2023-08-20 PROCEDURE — 25010000002 ROPIVACAINE PER 1 MG: Performed by: ANESTHESIOLOGY

## 2023-08-20 PROCEDURE — 25010000002 ONDANSETRON PER 1 MG: Performed by: ANESTHESIOLOGY

## 2023-08-20 PROCEDURE — 59025 FETAL NON-STRESS TEST: CPT

## 2023-08-20 PROCEDURE — 25010000002 LABETALOL 5 MG/ML SOLUTION: Performed by: OBSTETRICS & GYNECOLOGY

## 2023-08-20 PROCEDURE — C1755 CATHETER, INTRASPINAL: HCPCS

## 2023-08-20 RX ORDER — NIFEDIPINE 10 MG/1
10 CAPSULE ORAL ONCE
Status: COMPLETED | OUTPATIENT
Start: 2023-08-20 | End: 2023-08-20

## 2023-08-20 RX ORDER — EPHEDRINE SULFATE 5 MG/ML
10 INJECTION INTRAVENOUS
Status: DISCONTINUED | OUTPATIENT
Start: 2023-08-20 | End: 2023-08-24

## 2023-08-20 RX ORDER — DIPHENHYDRAMINE HYDROCHLORIDE 50 MG/ML
12.5 INJECTION INTRAMUSCULAR; INTRAVENOUS EVERY 8 HOURS PRN
Status: DISCONTINUED | OUTPATIENT
Start: 2023-08-20 | End: 2023-08-21 | Stop reason: SDUPTHER

## 2023-08-20 RX ORDER — METOCLOPRAMIDE HYDROCHLORIDE 5 MG/ML
10 INJECTION INTRAMUSCULAR; INTRAVENOUS ONCE AS NEEDED
Status: DISCONTINUED | OUTPATIENT
Start: 2023-08-20 | End: 2023-08-24

## 2023-08-20 RX ORDER — CITRIC ACID/SODIUM CITRATE 334-500MG
30 SOLUTION, ORAL ORAL ONCE
Status: COMPLETED | OUTPATIENT
Start: 2023-08-20 | End: 2023-08-21

## 2023-08-20 RX ORDER — LIDOCAINE HYDROCHLORIDE AND EPINEPHRINE BITARTRATE 20; .01 MG/ML; MG/ML
INJECTION, SOLUTION SUBCUTANEOUS AS NEEDED
Status: DISCONTINUED | OUTPATIENT
Start: 2023-08-20 | End: 2023-08-21 | Stop reason: SURG

## 2023-08-20 RX ORDER — BUPIVACAINE HYDROCHLORIDE 5 MG/ML
INJECTION, SOLUTION EPIDURAL; INTRACAUDAL AS NEEDED
Status: DISCONTINUED | OUTPATIENT
Start: 2023-08-20 | End: 2023-08-21 | Stop reason: SURG

## 2023-08-20 RX ORDER — LIDOCAINE HYDROCHLORIDE AND EPINEPHRINE 15; 5 MG/ML; UG/ML
INJECTION, SOLUTION EPIDURAL AS NEEDED
Status: DISCONTINUED | OUTPATIENT
Start: 2023-08-20 | End: 2023-08-21 | Stop reason: SURG

## 2023-08-20 RX ORDER — FAMOTIDINE 10 MG/ML
20 INJECTION, SOLUTION INTRAVENOUS ONCE AS NEEDED
Status: DISCONTINUED | OUTPATIENT
Start: 2023-08-20 | End: 2023-08-24

## 2023-08-20 RX ORDER — ONDANSETRON 2 MG/ML
4 INJECTION INTRAMUSCULAR; INTRAVENOUS ONCE AS NEEDED
Status: COMPLETED | OUTPATIENT
Start: 2023-08-20 | End: 2023-08-20

## 2023-08-20 RX ADMIN — SODIUM CHLORIDE, POTASSIUM CHLORIDE, SODIUM LACTATE AND CALCIUM CHLORIDE 125 ML/HR: 600; 310; 30; 20 INJECTION, SOLUTION INTRAVENOUS at 05:09

## 2023-08-20 RX ADMIN — SODIUM CHLORIDE, POTASSIUM CHLORIDE, SODIUM LACTATE AND CALCIUM CHLORIDE 125 ML/HR: 600; 310; 30; 20 INJECTION, SOLUTION INTRAVENOUS at 20:38

## 2023-08-20 RX ADMIN — VANCOMYCIN HYDROCHLORIDE 2000 MG: 10 INJECTION, POWDER, LYOPHILIZED, FOR SOLUTION INTRAVENOUS at 06:55

## 2023-08-20 RX ADMIN — SODIUM CHLORIDE, POTASSIUM CHLORIDE, SODIUM LACTATE AND CALCIUM CHLORIDE 1000 ML: 600; 310; 30; 20 INJECTION, SOLUTION INTRAVENOUS at 13:10

## 2023-08-20 RX ADMIN — LIDOCAINE HYDROCHLORIDE,EPINEPHRINE BITARTRATE 10 ML: 20; .01 INJECTION, SOLUTION INFILTRATION; PERINEURAL at 21:43

## 2023-08-20 RX ADMIN — ONDANSETRON 4 MG: 2 INJECTION INTRAMUSCULAR; INTRAVENOUS at 07:08

## 2023-08-20 RX ADMIN — ONDANSETRON 4 MG: 2 INJECTION INTRAMUSCULAR; INTRAVENOUS at 17:32

## 2023-08-20 RX ADMIN — Medication 20 MG: at 11:16

## 2023-08-20 RX ADMIN — BUPIVACAINE HYDROCHLORIDE 7 ML: 5 INJECTION, SOLUTION EPIDURAL; INTRACAUDAL at 13:43

## 2023-08-20 RX ADMIN — LIDOCAINE HYDROCHLORIDE AND EPINEPHRINE 3 ML: 15; 5 INJECTION, SOLUTION EPIDURAL at 13:43

## 2023-08-20 RX ADMIN — MAGNESIUM SULFATE HEPTAHYDRATE 2 G/HR: 40 INJECTION, SOLUTION INTRAVENOUS at 20:37

## 2023-08-20 RX ADMIN — NIFEDIPINE 10 MG: 10 CAPSULE ORAL at 05:31

## 2023-08-20 RX ADMIN — MAGNESIUM SULFATE HEPTAHYDRATE 2 G/HR: 40 INJECTION, SOLUTION INTRAVENOUS at 09:34

## 2023-08-20 RX ADMIN — NIFEDIPINE 30 MG: 30 TABLET, EXTENDED RELEASE ORAL at 09:35

## 2023-08-20 RX ADMIN — NIFEDIPINE 30 MG: 30 TABLET, EXTENDED RELEASE ORAL at 21:04

## 2023-08-20 RX ADMIN — SODIUM CHLORIDE, POTASSIUM CHLORIDE, SODIUM LACTATE AND CALCIUM CHLORIDE 125 ML/HR: 600; 310; 30; 20 INJECTION, SOLUTION INTRAVENOUS at 13:50

## 2023-08-20 NOTE — PROCEDURES
"Patient brought to labor and delivery for medical induction of labor secondary to severe preeclampsia at 35 weeks 4 days gestation.  Received request for placement of transcervical Fam bulb for cervical ripening.  Cervical exam: 1 cm / 30%/-3 station (cervix posterior and medium texture).  Cephalic presentation confirmed by bedside ultrasound.  Transcervical Fam placed per physician request.  FHT's class 1.  Patient tolerated well.  24 Upper sorbian, 60ml.    Willem Garrett \"Nena\" LA NENA Martinez MD  8/19/2023  21:43 EDT      "

## 2023-08-20 NOTE — ANESTHESIA PROCEDURE NOTES
Labor Epidural      Patient reassessed immediately prior to procedure    Patient location during procedure: OB  Start Time: 8/20/2023 1:33 PM  Stop Time: 8/20/2023 1:51 PM  Performed By  Anesthesiologist: Biju Chatman MD  Preanesthetic Checklist  Completed: patient identified, IV checked, site marked, risks and benefits discussed, surgical consent, monitors and equipment checked, pre-op evaluation and timeout performed  Prep:  Pt Position:sitting  Sterile Tech:cap, gloves, mask and sterile barrier  Monitoring:blood pressure monitoring  Epidural Block Procedure:  Approach:midline  Guidance:landmark technique  Location:L3-L4  Needle Type:Tuohy  Needle Gauge:17 G  Loss of Resistance Medium: air  Loss of Resistance: 5cm  Cath Depth at skin:15 cm  Paresthesia: none  Aspiration:negative  Test Dose:negative  Number of Attempts: 1  Post Assessment:  Dressing:occlusive dressing applied and secured with tape  Pt Tolerance:patient tolerated the procedure well with no apparent complications  Complications:no

## 2023-08-20 NOTE — PROGRESS NOTES
"08/20/23  11:59 EDT  Barbie CHOWDARY    SUBJECTIVE: Barbie is resting in bed, s/p FB, no complaints.     ASSESSMENTS:     /91   Pulse 82   Temp 98.1 øF (36.7 øC) (Oral)   Resp 16   Ht 162.6 cm (64\")   Wt 113 kg (249 lb)   SpO2 96%   BMI 42.74 kg/mý     Fetal Heart Rate Assessment   Method: Fetal HR Assessment Method: external   Beats/min: Fetal HR (beats/min): 130   Baseline: Fetal HR Baseline: normal range   Varibility: Fetal HR Variability: moderate (amplitude range 6 to 25 bpm)   Accels: Fetal HR Accelerations: greater than/equal to 15 bpm, lasting at least 15 seconds   Decels: Fetal HR Decelerations: absent   Tracing Category:  I     Uterine Assessment   Method: Method: external tocotransducer   Frequency (min): Contraction Frequency (Minutes): 2-4   Ctx Count in 10 min:     Duration:     Intensity: Contraction Intensity: no contractions   Intensity by IUPC:     Resting Tone: Uterine Resting Tone: soft by palpation   Resting Tone by IUPC:       Presentation: vtx   Cervix: Exam by: Method: sterile exam per physician   Dilation:  5cm   Effacement: Cervical Effacement: 70%   Station:  -2            PLAN: AROM performed, clear fluid. GBS cx result back and neg, will discontinue vancomycin. Continue magnesium sulfate at 2g/hr for seizure prophylaxis. Pitocin infusing per protocol. BPs occasionally severe range, labetalol protocol ordered PRN. Epidural as desires. Repeat SVE q2-4h or PRN.    Kimberly Cervantes MD  11:59 EDT  08/20/23    "

## 2023-08-20 NOTE — PROGRESS NOTES
"Pharmacy Consult-Vancomycin Dosing  Barbie CHOWDARY is a  23 y.o. female receiving vancomycin therapy.     Indication: group b strep positive in pregnancy   Consulting Provider: Dr Trevino  ID Consult: no    Goal Trough:  ?  Current Antimicrobial Therapy  Anti-Infectives (From admission, onward)      Ordered     Dose/Rate Route Frequency Start Stop    08/19/23 1703  vancomycin IVPB 2000 mg in 0.9% Sodium Chloride 500 mL        Ordering Provider: Darryl White PharmD    2,000 mg  250 mL/hr over 120 Minutes Intravenous Every 12 Hours 08/19/23 1800 08/21/23 1759    08/19/23 1655  Pharmacy to dose vancomycin        Ordering Provider: Kenny Trevino MD     Does not apply Continuous PRN 08/19/23 1654 08/21/23 1653            Allergies  Allergies as of 08/18/2023 - Reviewed 08/18/2023   Allergen Reaction Noted    Penicillins Anaphylaxis 01/21/2021       Labs    Results from last 7 days   Lab Units 08/18/23  1150   CREATININE mg/dL 0.44*       Results from last 7 days   Lab Units 08/19/23  0842 08/18/23  1150   WBC 10*3/mm3 12.70* 15.45*       Evaluation of Dosing     Last Dose Received in the ED/Outside Facility: na  Is Patient on Dialysis or Renal Replacement: no    Ht - 162.6 cm (64\")  Wt - 113 kg (249 lb)    Estimated Creatinine Clearance: 240.7 mL/min (A) (by C-G formula based on SCr of 0.44 mg/dL (L)).    Intake & Output (last 3 days)         08/17 0701  08/18 0700 08/18 0701  08/19 0700 08/19 0701  08/20 0700    I.V. (mL/kg)   478.7 (4.2)    IV Piggyback   500    Total Intake(mL/kg)   978.7 (8.7)    Urine (mL/kg/hr)  50 425 (0.3)    Total Output  50 425    Net  -50 +553.7                   Microbiology and Radiology  Microbiology Results (last 10 days)       Procedure Component Value - Date/Time    Group B Streptococcus Culture - Swab, Vaginal/Rectum [[id number]]  (Normal) Collected: 08/18/23 1618    Lab Status: Preliminary result Specimen: Swab from Vaginal/Rectum Updated: 08/19/23 1517     Group B Strep Culture " Culture in progress            Vancomycin Levels:                        Assessment/Plan:  Will start vancomycin @ 2000 mg(dose: 17.5 mg/kg) iv every 12  hours for 48 hours; will not check vancomycin level unless vancomycin duration is extended past 48 hours.  Darryl White, PharmD

## 2023-08-20 NOTE — PROGRESS NOTES
"08/20/23  16:01 EDT  Barbie CHOWDARY    SUBJECTIVE: Barbie is resting in bed, comfortable with epidural.     ASSESSMENTS:     /62   Pulse 77   Temp 98 øF (36.7 øC) (Oral)   Resp 18   Ht 162.6 cm (64\")   Wt 113 kg (249 lb)   SpO2 97%   BMI 42.74 kg/mý     Fetal Heart Rate Assessment   Method: Fetal HR Assessment Method: external   Beats/min: Fetal HR (beats/min): 120   Baseline: Fetal HR Baseline: normal range   Varibility: Fetal HR Variability: moderate (amplitude range 6 to 25 bpm)   Accels: Fetal HR Accelerations: greater than/equal to 15 bpm, lasting at least 15 seconds   Decels: Fetal HR Decelerations: absent   Tracing Category:  I     Uterine Assessment   Method: Method: external tocotransducer   Frequency (min): Contraction Frequency (Minutes): poor tracing   Ctx Count in 10 min:     Duration:     Intensity: Contraction Intensity: no contractions   Intensity by IUPC:     Resting Tone: Uterine Resting Tone: soft by palpation   Resting Tone by IUPC:       Presentation: vtx   Cervix: Exam by: Method: sterile exam per physician   Dilation:  5cm   Effacement: 80%   Station:  -1            PLAN: FHR reassuring. BP wnl, magnesium infusing at 2g/hr. Pitocin infusing at 26 milliunits/minute, IUPC placed. Repeat SVE q2-4h or PRN.    Kimberly Cervantes MD  16:01 EDT  08/20/23    "

## 2023-08-20 NOTE — ANESTHESIA PREPROCEDURE EVALUATION
Anesthesia Evaluation     Patient summary reviewed and Nursing notes reviewed                Airway   Mallampati: I  TM distance: >3 FB  Neck ROM: full  No difficulty expected  Dental - normal exam     Pulmonary - negative pulmonary ROS and normal exam   Cardiovascular - negative cardio ROS and normal exam        Neuro/Psych- negative ROS  GI/Hepatic/Renal/Endo - negative ROS   (+) obesity, morbid obesity    Musculoskeletal (-) negative ROS    Abdominal   (+) obese    Bowel sounds: normal.   Substance History - negative use     OB/GYN negative ob/gyn ROS   (+) Pregnant, Preeclampsia, pregnancy induced hypertension        Other                      Anesthesia Plan    ASA 2 - emergent     epidural       Anesthetic plan, risks, benefits, and alternatives have been provided, discussed and informed consent has been obtained with: patient.    Use of blood products discussed with patient .    Plan discussed with attending.    CODE STATUS:    Level Of Support Discussed With: Patient  Code Status (Patient has no pulse and is not breathing): CPR (Attempt to Resuscitate)  Medical Interventions (Patient has pulse or is breathing): Full Support

## 2023-08-21 PROBLEM — Z34.93 THIRD TRIMESTER PREGNANCY: Status: RESOLVED | Noted: 2023-08-18 | Resolved: 2023-08-21

## 2023-08-21 PROBLEM — Z98.891 S/P PRIMARY LOW TRANSVERSE C-SECTION: Status: ACTIVE | Noted: 2023-08-21

## 2023-08-21 PROBLEM — O13.9 GESTATIONAL HYPERTENSION: Status: RESOLVED | Noted: 2023-08-19 | Resolved: 2023-08-21

## 2023-08-21 LAB
BASOPHILS # BLD AUTO: 0.03 10*3/MM3 (ref 0–0.2)
BASOPHILS NFR BLD AUTO: 0.2 % (ref 0–1.5)
DEPRECATED RDW RBC AUTO: 44.4 FL (ref 37–54)
EOSINOPHIL # BLD AUTO: 0.03 10*3/MM3 (ref 0–0.4)
EOSINOPHIL NFR BLD AUTO: 0.2 % (ref 0.3–6.2)
ERYTHROCYTE [DISTWIDTH] IN BLOOD BY AUTOMATED COUNT: 13.9 % (ref 12.3–15.4)
HCT VFR BLD AUTO: 35.4 % (ref 34–46.6)
HGB BLD-MCNC: 11.3 G/DL (ref 12–15.9)
IMM GRANULOCYTES # BLD AUTO: 0.12 10*3/MM3 (ref 0–0.05)
IMM GRANULOCYTES NFR BLD AUTO: 0.7 % (ref 0–0.5)
LYMPHOCYTES # BLD AUTO: 1.52 10*3/MM3 (ref 0.7–3.1)
LYMPHOCYTES NFR BLD AUTO: 8.6 % (ref 19.6–45.3)
MCH RBC QN AUTO: 28.1 PG (ref 26.6–33)
MCHC RBC AUTO-ENTMCNC: 31.9 G/DL (ref 31.5–35.7)
MCV RBC AUTO: 88.1 FL (ref 79–97)
MONOCYTES # BLD AUTO: 0.5 10*3/MM3 (ref 0.1–0.9)
MONOCYTES NFR BLD AUTO: 2.8 % (ref 5–12)
NEUTROPHILS NFR BLD AUTO: 15.51 10*3/MM3 (ref 1.7–7)
NEUTROPHILS NFR BLD AUTO: 87.5 % (ref 42.7–76)
NRBC BLD AUTO-RTO: 0 /100 WBC (ref 0–0.2)
PLATELET # BLD AUTO: 215 10*3/MM3 (ref 140–450)
PMV BLD AUTO: 10.3 FL (ref 6–12)
RBC # BLD AUTO: 4.02 10*6/MM3 (ref 3.77–5.28)
WBC NRBC COR # BLD: 17.71 10*3/MM3 (ref 3.4–10.8)

## 2023-08-21 PROCEDURE — 25010000002 VANCOMYCIN 10 G RECONSTITUTED SOLUTION

## 2023-08-21 PROCEDURE — 25010000002 METOCLOPRAMIDE PER 10 MG: Performed by: ANESTHESIOLOGY

## 2023-08-21 PROCEDURE — 0 MORPHINE PER 10 MG: Performed by: ANESTHESIOLOGY

## 2023-08-21 PROCEDURE — 25010000002 KETOROLAC TROMETHAMINE PER 15 MG: Performed by: OBSTETRICS & GYNECOLOGY

## 2023-08-21 PROCEDURE — 25010000002 GENTAMICIN PER 80 MG: Performed by: OBSTETRICS & GYNECOLOGY

## 2023-08-21 PROCEDURE — 25010000002 MIDAZOLAM PER 1 MG: Performed by: ANESTHESIOLOGY

## 2023-08-21 PROCEDURE — 25010000002 ONDANSETRON PER 1 MG: Performed by: ANESTHESIOLOGY

## 2023-08-21 PROCEDURE — 25010000002 AZITHROMYCIN PER 500 MG: Performed by: OBSTETRICS & GYNECOLOGY

## 2023-08-21 PROCEDURE — 0 MAGNESIUM SULFATE 20 GM/500ML SOLUTION: Performed by: OBSTETRICS & GYNECOLOGY

## 2023-08-21 PROCEDURE — 85025 COMPLETE CBC W/AUTO DIFF WBC: CPT | Performed by: OBSTETRICS & GYNECOLOGY

## 2023-08-21 PROCEDURE — 25010000002 ONDANSETRON PER 1 MG: Performed by: OBSTETRICS & GYNECOLOGY

## 2023-08-21 RX ORDER — ALUMINA, MAGNESIA, AND SIMETHICONE 2400; 2400; 240 MG/30ML; MG/30ML; MG/30ML
15 SUSPENSION ORAL EVERY 4 HOURS PRN
Status: DISCONTINUED | OUTPATIENT
Start: 2023-08-21 | End: 2023-08-24 | Stop reason: HOSPADM

## 2023-08-21 RX ORDER — ONDANSETRON 2 MG/ML
INJECTION INTRAMUSCULAR; INTRAVENOUS AS NEEDED
Status: DISCONTINUED | OUTPATIENT
Start: 2023-08-21 | End: 2023-08-21 | Stop reason: SURG

## 2023-08-21 RX ORDER — METOCLOPRAMIDE HYDROCHLORIDE 5 MG/ML
INJECTION INTRAMUSCULAR; INTRAVENOUS AS NEEDED
Status: DISCONTINUED | OUTPATIENT
Start: 2023-08-21 | End: 2023-08-21 | Stop reason: SURG

## 2023-08-21 RX ORDER — OXYCODONE HYDROCHLORIDE 10 MG/1
10 TABLET ORAL EVERY 4 HOURS PRN
Status: DISCONTINUED | OUTPATIENT
Start: 2023-08-21 | End: 2023-08-24 | Stop reason: HOSPADM

## 2023-08-21 RX ORDER — CARBOPROST TROMETHAMINE 250 UG/ML
250 INJECTION, SOLUTION INTRAMUSCULAR AS NEEDED
Status: DISCONTINUED | OUTPATIENT
Start: 2023-08-21 | End: 2023-08-24 | Stop reason: HOSPADM

## 2023-08-21 RX ORDER — ONDANSETRON 4 MG/1
4 TABLET, FILM COATED ORAL EVERY 6 HOURS PRN
Status: DISCONTINUED | OUTPATIENT
Start: 2023-08-21 | End: 2023-08-24 | Stop reason: HOSPADM

## 2023-08-21 RX ORDER — DOCUSATE SODIUM 100 MG/1
100 CAPSULE, LIQUID FILLED ORAL 2 TIMES DAILY PRN
Status: DISCONTINUED | OUTPATIENT
Start: 2023-08-21 | End: 2023-08-24 | Stop reason: HOSPADM

## 2023-08-21 RX ORDER — OXYTOCIN/0.9 % SODIUM CHLORIDE 30/500 ML
PLASTIC BAG, INJECTION (ML) INTRAVENOUS AS NEEDED
Status: DISCONTINUED | OUTPATIENT
Start: 2023-08-21 | End: 2023-08-21 | Stop reason: SURG

## 2023-08-21 RX ORDER — METHYLERGONOVINE MALEATE 0.2 MG/ML
200 INJECTION INTRAVENOUS AS NEEDED
Status: DISCONTINUED | OUTPATIENT
Start: 2023-08-21 | End: 2023-08-24 | Stop reason: HOSPADM

## 2023-08-21 RX ORDER — LABETALOL HYDROCHLORIDE 5 MG/ML
20-80 INJECTION, SOLUTION INTRAVENOUS
Status: ACTIVE | OUTPATIENT
Start: 2023-08-21 | End: 2023-08-22

## 2023-08-21 RX ORDER — MORPHINE SULFATE 0.5 MG/ML
INJECTION, SOLUTION EPIDURAL; INTRATHECAL; INTRAVENOUS AS NEEDED
Status: DISCONTINUED | OUTPATIENT
Start: 2023-08-21 | End: 2023-08-21 | Stop reason: SURG

## 2023-08-21 RX ORDER — MISOPROSTOL 200 UG/1
600 TABLET ORAL AS NEEDED
Status: DISCONTINUED | OUTPATIENT
Start: 2023-08-21 | End: 2023-08-24 | Stop reason: HOSPADM

## 2023-08-21 RX ORDER — PRENATAL VIT/IRON FUM/FOLIC AC 27MG-0.8MG
1 TABLET ORAL DAILY
Status: DISCONTINUED | OUTPATIENT
Start: 2023-08-21 | End: 2023-08-24 | Stop reason: HOSPADM

## 2023-08-21 RX ORDER — ACETAMINOPHEN 500 MG
1000 TABLET ORAL EVERY 6 HOURS
Status: COMPLETED | OUTPATIENT
Start: 2023-08-21 | End: 2023-08-22

## 2023-08-21 RX ORDER — SIMETHICONE 80 MG
80 TABLET,CHEWABLE ORAL 4 TIMES DAILY PRN
Status: DISCONTINUED | OUTPATIENT
Start: 2023-08-21 | End: 2023-08-24 | Stop reason: HOSPADM

## 2023-08-21 RX ORDER — ACETAMINOPHEN 500 MG
1000 TABLET ORAL ONCE
Status: COMPLETED | OUTPATIENT
Start: 2023-08-21 | End: 2023-08-21

## 2023-08-21 RX ORDER — HYDROCORTISONE 25 MG/G
1 CREAM TOPICAL AS NEEDED
Status: DISCONTINUED | OUTPATIENT
Start: 2023-08-21 | End: 2023-08-24 | Stop reason: HOSPADM

## 2023-08-21 RX ORDER — ONDANSETRON 2 MG/ML
4 INJECTION INTRAMUSCULAR; INTRAVENOUS EVERY 6 HOURS PRN
Status: DISCONTINUED | OUTPATIENT
Start: 2023-08-21 | End: 2023-08-24 | Stop reason: HOSPADM

## 2023-08-21 RX ORDER — POLYETHYLENE GLYCOL 3350 17 G/17G
17 POWDER, FOR SOLUTION ORAL DAILY
Status: DISCONTINUED | OUTPATIENT
Start: 2023-08-21 | End: 2023-08-24 | Stop reason: HOSPADM

## 2023-08-21 RX ORDER — MIDAZOLAM HYDROCHLORIDE 1 MG/ML
INJECTION INTRAMUSCULAR; INTRAVENOUS AS NEEDED
Status: DISCONTINUED | OUTPATIENT
Start: 2023-08-21 | End: 2023-08-21 | Stop reason: SURG

## 2023-08-21 RX ORDER — MAGNESIUM SULFATE HEPTAHYDRATE 40 MG/ML
2 INJECTION, SOLUTION INTRAVENOUS CONTINUOUS
Status: DISPENSED | OUTPATIENT
Start: 2023-08-21 | End: 2023-08-22

## 2023-08-21 RX ORDER — LIDOCAINE HYDROCHLORIDE 20 MG/ML
INJECTION, SOLUTION INFILTRATION; PERINEURAL AS NEEDED
Status: DISCONTINUED | OUTPATIENT
Start: 2023-08-21 | End: 2023-08-21 | Stop reason: SURG

## 2023-08-21 RX ORDER — OXYCODONE HYDROCHLORIDE 5 MG/1
5 TABLET ORAL EVERY 4 HOURS PRN
Status: DISCONTINUED | OUTPATIENT
Start: 2023-08-21 | End: 2023-08-24 | Stop reason: HOSPADM

## 2023-08-21 RX ORDER — ACETAMINOPHEN 325 MG/1
650 TABLET ORAL EVERY 6 HOURS
Status: DISCONTINUED | OUTPATIENT
Start: 2023-08-22 | End: 2023-08-24 | Stop reason: HOSPADM

## 2023-08-21 RX ORDER — ALUMINA, MAGNESIA, AND SIMETHICONE 2400; 2400; 240 MG/30ML; MG/30ML; MG/30ML
15 SUSPENSION ORAL EVERY 4 HOURS PRN
Status: DISCONTINUED | OUTPATIENT
Start: 2023-08-21 | End: 2023-08-21 | Stop reason: SDUPTHER

## 2023-08-21 RX ORDER — KETOROLAC TROMETHAMINE 30 MG/ML
30 INJECTION, SOLUTION INTRAMUSCULAR; INTRAVENOUS ONCE
Status: COMPLETED | OUTPATIENT
Start: 2023-08-21 | End: 2023-08-21

## 2023-08-21 RX ORDER — KETOROLAC TROMETHAMINE 15 MG/ML
15 INJECTION, SOLUTION INTRAMUSCULAR; INTRAVENOUS EVERY 6 HOURS
Status: COMPLETED | OUTPATIENT
Start: 2023-08-21 | End: 2023-08-22

## 2023-08-21 RX ORDER — SODIUM CHLORIDE, SODIUM LACTATE, POTASSIUM CHLORIDE, CALCIUM CHLORIDE 600; 310; 30; 20 MG/100ML; MG/100ML; MG/100ML; MG/100ML
INJECTION, SOLUTION INTRAVENOUS CONTINUOUS PRN
Status: DISCONTINUED | OUTPATIENT
Start: 2023-08-21 | End: 2023-08-21 | Stop reason: SURG

## 2023-08-21 RX ORDER — IBUPROFEN 600 MG/1
600 TABLET ORAL EVERY 6 HOURS
Status: DISCONTINUED | OUTPATIENT
Start: 2023-08-22 | End: 2023-08-24 | Stop reason: HOSPADM

## 2023-08-21 RX ORDER — DIPHENHYDRAMINE HCL 25 MG
25 CAPSULE ORAL EVERY 4 HOURS PRN
Status: DISCONTINUED | OUTPATIENT
Start: 2023-08-21 | End: 2023-08-24 | Stop reason: HOSPADM

## 2023-08-21 RX ORDER — CALCIUM CARBONATE 500 MG/1
1 TABLET, CHEWABLE ORAL EVERY 4 HOURS PRN
Status: DISCONTINUED | OUTPATIENT
Start: 2023-08-21 | End: 2023-08-24 | Stop reason: HOSPADM

## 2023-08-21 RX ORDER — FAMOTIDINE 10 MG/ML
INJECTION, SOLUTION INTRAVENOUS AS NEEDED
Status: DISCONTINUED | OUTPATIENT
Start: 2023-08-21 | End: 2023-08-21 | Stop reason: SURG

## 2023-08-21 RX ADMIN — ONDANSETRON 4 MG: 2 INJECTION INTRAMUSCULAR; INTRAVENOUS at 01:50

## 2023-08-21 RX ADMIN — NIFEDIPINE 30 MG: 30 TABLET, EXTENDED RELEASE ORAL at 09:30

## 2023-08-21 RX ADMIN — MORPHINE SULFATE 3 MG: 0.5 INJECTION, SOLUTION EPIDURAL; INTRATHECAL; INTRAVENOUS at 01:59

## 2023-08-21 RX ADMIN — SODIUM CHLORIDE, POTASSIUM CHLORIDE, SODIUM LACTATE AND CALCIUM CHLORIDE 75 ML/HR: 600; 310; 30; 20 INJECTION, SOLUTION INTRAVENOUS at 09:30

## 2023-08-21 RX ADMIN — AZITHROMYCIN 500 MG: 500 INJECTION, POWDER, LYOPHILIZED, FOR SOLUTION INTRAVENOUS at 01:21

## 2023-08-21 RX ADMIN — ONDANSETRON 4 MG: 2 INJECTION INTRAMUSCULAR; INTRAVENOUS at 07:42

## 2023-08-21 RX ADMIN — PRENATAL VITAMINS-IRON FUMARATE 27 MG IRON-FOLIC ACID 0.8 MG TABLET 1 TABLET: at 09:30

## 2023-08-21 RX ADMIN — KETOROLAC TROMETHAMINE 15 MG: 15 INJECTION, SOLUTION INTRAMUSCULAR; INTRAVENOUS at 21:59

## 2023-08-21 RX ADMIN — LIDOCAINE HYDROCHLORIDE,EPINEPHRINE BITARTRATE 10 ML: 20; .01 INJECTION, SOLUTION INFILTRATION; PERINEURAL at 00:39

## 2023-08-21 RX ADMIN — ACETAMINOPHEN 1000 MG: 500 TABLET ORAL at 06:25

## 2023-08-21 RX ADMIN — FAMOTIDINE 20 MG: 10 INJECTION, SOLUTION INTRAVENOUS at 01:50

## 2023-08-21 RX ADMIN — KETOROLAC TROMETHAMINE 15 MG: 15 INJECTION, SOLUTION INTRAMUSCULAR; INTRAVENOUS at 16:35

## 2023-08-21 RX ADMIN — SODIUM CHLORIDE, POTASSIUM CHLORIDE, SODIUM LACTATE AND CALCIUM CHLORIDE 125 ML/HR: 600; 310; 30; 20 INJECTION, SOLUTION INTRAVENOUS at 22:00

## 2023-08-21 RX ADMIN — MIDAZOLAM HYDROCHLORIDE 2 MG: 1 INJECTION, SOLUTION INTRAMUSCULAR; INTRAVENOUS at 01:47

## 2023-08-21 RX ADMIN — VANCOMYCIN HYDROCHLORIDE 1750 MG: 10 INJECTION, POWDER, LYOPHILIZED, FOR SOLUTION INTRAVENOUS at 03:41

## 2023-08-21 RX ADMIN — SODIUM CHLORIDE, POTASSIUM CHLORIDE, SODIUM LACTATE AND CALCIUM CHLORIDE: 600; 310; 30; 20 INJECTION, SOLUTION INTRAVENOUS at 00:51

## 2023-08-21 RX ADMIN — MORPHINE SULFATE 2 MG: 0.5 INJECTION, SOLUTION EPIDURAL; INTRATHECAL; INTRAVENOUS at 02:01

## 2023-08-21 RX ADMIN — KETOROLAC TROMETHAMINE 30 MG: 30 INJECTION, SOLUTION INTRAMUSCULAR; INTRAVENOUS at 04:01

## 2023-08-21 RX ADMIN — GENTAMICIN SULFATE 390 MG: 40 INJECTION, SOLUTION INTRAMUSCULAR; INTRAVENOUS at 03:10

## 2023-08-21 RX ADMIN — ACETAMINOPHEN 1000 MG: 500 TABLET ORAL at 01:21

## 2023-08-21 RX ADMIN — LIDOCAINE HYDROCHLORIDE 15 ML: 20 INJECTION, SOLUTION INFILTRATION; PERINEURAL at 01:41

## 2023-08-21 RX ADMIN — NIFEDIPINE 30 MG: 30 TABLET, EXTENDED RELEASE ORAL at 21:59

## 2023-08-21 RX ADMIN — MAGNESIUM SULFATE HEPTAHYDRATE 2 G/HR: 40 INJECTION, SOLUTION INTRAVENOUS at 04:05

## 2023-08-21 RX ADMIN — KETOROLAC TROMETHAMINE 15 MG: 15 INJECTION, SOLUTION INTRAMUSCULAR; INTRAVENOUS at 09:30

## 2023-08-21 RX ADMIN — ACETAMINOPHEN 1000 MG: 500 TABLET ORAL at 18:30

## 2023-08-21 RX ADMIN — METOCLOPRAMIDE 10 MG: 5 INJECTION, SOLUTION INTRAMUSCULAR; INTRAVENOUS at 01:50

## 2023-08-21 RX ADMIN — MAGNESIUM SULFATE HEPTAHYDRATE 2 G/HR: 40 INJECTION, SOLUTION INTRAVENOUS at 14:40

## 2023-08-21 RX ADMIN — ACETAMINOPHEN 1000 MG: 500 TABLET ORAL at 14:40

## 2023-08-21 RX ADMIN — Medication 500 ML: at 01:58

## 2023-08-21 RX ADMIN — SODIUM CITRATE AND CITRIC ACID MONOHYDRATE 30 ML: 500; 334 SOLUTION ORAL at 01:21

## 2023-08-21 NOTE — ANESTHESIA POSTPROCEDURE EVALUATION
Patient: Barbie CHOWDARY    Procedure Summary       Date: 23 Room / Location: [facility] LABOR DELIVERY 2 /  [facility]OR DELIVERY    Anesthesia Start: 1333 Anesthesia Stop: 23 0232    Procedure:  SECTION PRIMARY (Abdomen) Diagnosis:     Surgeons: Kimberly Cervantes MD Provider: Biju Chatman MD    Anesthesia Type: epidural ASA Status: 2 - Emergent            Anesthesia Type: epidural    Vitals  Vitals Value Taken Time   BP 0/0 23 0126   Temp 99.2 øF (37.3 øC) 23 2350   Pulse 88 23 0057   Resp 16 23 2230   SpO2 97 % 23   Vitals shown include unvalidated device data.        Post Anesthesia Care and Evaluation    Patient location during evaluation: bedside  Patient participation: complete - patient participated  Level of consciousness: awake and alert  Pain score: 0  Pain management: adequate    Airway patency: patent  Anesthetic complications: No anesthetic complications    Cardiovascular status: acceptable  Respiratory status: acceptable  Hydration status: acceptable

## 2023-08-21 NOTE — PAYOR COMM NOTE
"Barbie CHOWDARY (23 y.o. Female) [id number]        Date of Birth   2000    Social Security Number       Address   [address] [place] [address]    Home Phone   [phone]    MRN   [id number]       Hoahaoism   Mu-ism    Marital Status                               Admission Date   23    Admission Type   Elective    Admitting Provider   Connie Nguyen MD    Attending Provider   Connie Nguyen MD    Department, Room/Bed   [facility] ANTEPARTUM, N326/       Discharge Date       Discharge Disposition       Discharge Destination                                 Attending Provider: Connie Nguyen MD    Allergies: Penicillins    Isolation: None   Infection: None   Code Status: CPR    Ht: 162.6 cm (64\")   Wt: 113 kg (249 lb)    Admission Cmt: None   Principal Problem: None                  Active Insurance as of 2023       Primary Coverage       Payor Plan Insurance Group Employer/Plan Group    ANTHEM BLUE CROSS ANTHEM BLUE CROSS BLUE SHIELD PPO [id number]       Payor Plan Address Payor Plan Phone Number Payor Plan Fax Number Effective Dates    [address] [phone]  2021 - None Entered    [place] [address]         Subscriber Name Subscriber Birth Date Member ID       BARBIE CHOWDARY 2000 [id number]                     Emergency Contacts            No emergency contacts on file.              Insurance Information                  ANTHEM BLUE CROSS/ANTHEM BLUE CROSS BLUE SHIELD PPO Phone: [phone]    Subscriber: Barbie Chowdary Subscriber#: [id number]    Group#: [id number] Precert#: --             History & Physical        Connie Nguyen MD at 23 1229          [facility]  Obstetric History and Physical Triage    CC: elevated BP    SUBJECTIVE:     Patient is a 23 y.o. female  currently at 35w3d, who presents with elevated BP from the office. Mild range pressures in office and at home over the past week. Has noticed " some edema, but otherwise feels well. No HA, abdominal pain or vis changes.   + FM no VB or LOF  Sent to triage for labs and serial BP.      Prenatal Information:  Prenatal Results       Initial Prenatal Labs       Test Value Reference Range Date Time    Hemoglobin  14.3 g/dL 12.0 - 15.9 03/16/23 1442    Hematocrit  41.6 % 34.0 - 46.6 03/16/23 1442    Platelets  312 10*3/mm3 140 - 450 03/16/23 1442    Rubella IgG  10.40 index Immune >0.99 03/16/23 1442    Hepatitis B SAg  Non-Reactive  Non-Reactive 03/16/23 1442    Hepatitis C Ab  Non-Reactive  Non-Reactive 03/16/23 1442    RPR        T. Pallidum Ab         ABO  A   03/16/23 1442    Rh  Positive   03/16/23 1442    Antibody Screen  Negative   03/16/23 1442    HIV  Non-Reactive  Non-Reactive 03/16/23 1442    Urine Culture  Staphylococcus epidermidis   03/16/23 1442    Gonorrhea        Chlamydia        TSH        HgB A1c         Varicella IgG        HgB Electrophoresis         Cystic fibrosis                   Fetal testing        Test Value Reference Range Date Time    NIPT        MSAFP        AFP-4                  2nd and 3rd Trimester       Test Value Reference Range Date Time    Hemoglobin (repeated)  12.2 g/dL 12.0 - 15.9 08/18/23 1150       11.4 g/dL 12.0 - 15.9 06/05/23 1303    Hematocrit (repeated)  36.2 % 34.0 - 46.6 08/18/23 1150       33.4 % 34.0 - 46.6 06/05/23 1303    Platelets   264 10*3/mm3 140 - 450 08/18/23 1150       288 10*3/mm3 140 - 450 06/05/23 1303       312 10*3/mm3 140 - 450 03/16/23 1442    GCT  120 mg/dL 65 - 139 06/05/23 1303    Antibody Screen (repeated)  Negative   06/05/23 1303       Negative   03/16/23 1442    GTT Fasting        GTT 1 Hr        GTT 2 Hr        GTT 3 Hr        Group B Strep                  Other testing        Test Value Reference Range Date Time    Parvo IgG         CMV IgG                   Drug Screening       Test Value Reference Range Date Time    Amphetamine Screen  Negative  Negative 03/16/23 1442    Barbiturate  Screen  Negative  Negative 03/16/23 1442    Benzodiazepine Screen  Negative  Negative 03/16/23 1442    Methadone Screen  Negative  Negative 03/16/23 1442    Phencyclidine Screen  Negative  Negative 03/16/23 1442    Opiates Screen  Negative  Negative 03/16/23 1442    THC Screen  Negative  Negative 03/16/23 1442    Cocaine Screen  Negative  Negative 03/16/23 1442    Propoxyphene Screen  Negative  Negative 03/16/23 1442    Buprenorphine Screen  Negative  Negative 03/16/23 1442    Methamphetamine Screen  Negative  Negative 03/16/23 1442    Oxycodone Screen  Negative  Negative 03/16/23 1442    Tricyclic Antidepressants Screen  Negative  Negative 03/16/23 1442              Legend    ^: Historical                          External Prenatal Results       Pregnancy Outside Results - Transcribed From Office Records - See Scanned Records For Details       Test Value Date Time    ABO  A  03/16/23 1442    Rh  Positive  03/16/23 1442    Antibody Screen  Negative  06/05/23 1303       Negative  03/16/23 1442    Varicella IgG       Rubella  10.40 index 03/16/23 1442    Hgb  12.2 g/dL 08/18/23 1150       11.4 g/dL 06/05/23 1303       14.3 g/dL 03/16/23 1442    Hct  36.2 % 08/18/23 1150       33.4 % 06/05/23 1303       41.6 % 03/16/23 1442    Glucose Fasting GTT       Glucose Tolerance Test 1 hour       Glucose Tolerance Test 3 hour       Gonorrhea (discrete)       Chlamydia (discrete)       RPR       VDRL       Syphilis Antibody       HBsAg  Non-Reactive  03/16/23 1442    Herpes Simplex Virus PCR       Herpes Simplex VIrus Culture       HIV  Non-Reactive  03/16/23 1442    Hep C RNA Quant PCR       Hep C Antibody  Non-Reactive  03/16/23 1442    AFP       Group B Strep       GBS Susceptibility to Clindamycin       GBS Susceptibility to Erythromycin       Fetal Fibronectin       Genetic Testing, Maternal Blood                 Drug Screening       Test Value Date Time    Urine Drug Screen       Amphetamine Screen  Negative  03/16/23 1442     Barbiturate Screen  Negative  23 1442    Benzodiazepine Screen  Negative  23 1442    Methadone Screen  Negative  23 1442    Phencyclidine Screen  Negative  23 1442    Opiates Screen  Negative  23 1442    THC Screen  Negative  23 1442    Cocaine Screen       Propoxyphene Screen  Negative  23 1442    Buprenorphine Screen  Negative  23 1442    Methamphetamine Screen       Oxycodone Screen  Negative  23 1442    Tricyclic Antidepressants Screen  Negative  23 1442              Legend    ^: Historical                             OB History:                     OB History    Para Term  AB Living   2 0 0 0 1 0   SAB IAB Ectopic Molar Multiple Live Births   1 0 0 0 0 0      # Outcome Date GA Lbr Angelo/2nd Weight Sex Delivery Anes PTL Lv   2 Current            1 SAB 2019 9w0d    SAB         Allergies:                        Allergies   Allergen Reactions    Penicillins Anaphylaxis      Past Medical History: Past Medical History:   Diagnosis Date    ADHD       Past Surgical History No past surgical history on file.   Family History: No family history on file.   Social History:  reports that she has never smoked. She has never used smokeless tobacco.   reports no history of alcohol use.   reports no history of drug use.    General ROS: Pertinent items are noted in HPI, all other systems reviewed and negative   Medications: amphetamine-dextroamphetamine, amphetamine-dextroamphetamine XR, cetirizine, famotidine, and prenatal vitamin 27-0.8       Objective       Vital Signs Range for the last 24 hours  Temperature: Temp:  [98 øF (36.7 øC)] 98 øF (36.7 øC)   BP: BP: (142)/(102) 142/102   Pulse: Heart Rate:  [96] 96   Respirations: Resp:  [18] 18   SPO2: SpO2:  [96 %] 96 %               Physical Examination: General appearance - alert, well appearing, and in no distress  Chest - clear to auscultation, no wheezes, rales or rhonchi, symmetric air entry  Heart -  normal rate, regular rhythm, normal S1, S2, no murmurs, rubs, clicks or gallops  Abdomen - Soft, gravid, nontender  Extremities - pedal edema 2 +        Fetal Heart Rate Assessment           Baseline:  130   Variability:  mod   Accels:  presetn   Decels:  absent   Tracing Category:  1     Uterine Assessment   Method:     Frequency (min):     Ctx Count in 10 min:  none     Laboratory Results:  WBC   Date Value Ref Range Status   08/18/2023 15.45 (H) 3.40 - 10.80 10*3/mm3 Final     RBC   Date Value Ref Range Status   08/18/2023 4.27 3.77 - 5.28 10*6/mm3 Final     Hemoglobin   Date Value Ref Range Status   08/18/2023 12.2 12.0 - 15.9 g/dL Final     Hematocrit   Date Value Ref Range Status   08/18/2023 36.2 34.0 - 46.6 % Final     MCV   Date Value Ref Range Status   08/18/2023 84.8 79.0 - 97.0 fL Final     MCH   Date Value Ref Range Status   08/18/2023 28.6 26.6 - 33.0 pg Final     MCHC   Date Value Ref Range Status   08/18/2023 33.7 31.5 - 35.7 g/dL Final     RDW   Date Value Ref Range Status   08/18/2023 13.6 12.3 - 15.4 % Final     RDW-SD   Date Value Ref Range Status   08/18/2023 41.7 37.0 - 54.0 fl Final     MPV   Date Value Ref Range Status   08/18/2023 10.7 6.0 - 12.0 fL Final     Platelets   Date Value Ref Range Status   08/18/2023 264 140 - 450 10*3/mm3 Final   CMP:        Lab 08/18/23  1150   CREATININE 0.44*   ALT (SGPT) 6   AST (SGOT) 12   BILIRUBIN 0.2   ALK PHOS 165*      Uric acid 4.6      Assessment/Plan:   IUP 35w3d with GHTN    1.GHTN: largely high mild range pressures, with normal labs and no other sx. Will start nifedipine 30 XL bid. And plan 24 hr UP    2.recommend bed rest, letter given to stop work     3.FWB reassuring.     4. Pt given 10 mg Nifedipine po. Will allow her to go home and pack a bag for the weekend. Will come back to be re-admitted for observation/24 hr UP on APU later today.       Connie Nguyen MD  8/18/2023  12:29 EDT      Electronically signed by Connie Nguyen MD at  "08/18/23 1407       H&P signed by [employer], [employer] at 08/18/23 1438         [Media Unavailable] Scan on 8/18/2023 by [employer], [employer]: PRENATAL RECORDS          Electronically signed by [employer], [employer] at 08/18/23 1438          Physician Progress Notes (last 7 days)        Kimberly Cervantes MD at 08/21/23 0107          08/21/23  01:07 EDT  Barbie CHOWDARY    SUBJECTIVE: Barbie is tired, does not know how much longer she can continue with the induction process.     ASSESSMENTS:     /72   Pulse 88   Temp 99.2 øF (37.3 øC) (Oral)   Resp 16   Ht 162.6 cm (64\")   Wt 113 kg (249 lb)   SpO2 97%   BMI 42.74 kg/mý     Fetal Heart Rate Assessment   Method: Fetal HR Assessment Method: external   Beats/min: Fetal HR (beats/min): 115   Baseline: Fetal HR Baseline: normal range   Varibility: Fetal HR Variability: moderate (amplitude range 6 to 25 bpm)   Accels: Fetal HR Accelerations: greater than/equal to 15 bpm, lasting at least 15 seconds   Decels: Absent   Tracing Category:  I     Uterine Assessment   Method: Method: IUPC (intrauterine pressure catheter)   Frequency (min): Contraction Frequency (Minutes): 2-3   Ctx Count in 10 min:     Duration:     Intensity: Contraction Intensity: strong by palpation   Intensity by IUPC: Contraction Intensity (mmHg) by IUPC: 50-60   Resting Tone: Uterine Resting Tone: soft by palpation   Resting Tone by IUPC: Uterine Resting Tone (mmHg) by IUPC: 25     Presentation: vtx   Cervix: Exam by: Method: sterile exam per physician   Dilation:  8cm - more cervix felt circumferentially on this exam   Effacement: 90%   Station:  -1            PLAN: SVE without change over several exams. Pitocin was maxed at 30 milliunits/minute, stopped for a period of time due to decelerations, restarted at half. Ctx have been intermittently adequate per IUPC tracing. Reviewed options of continuing with IOL vs proceeding with delivery due to arrest of dilation. R/B/A reviewed, questions " "answered, pt requests . Discussed surgical risks including blood loss, infection, and possible injury to bladder/bowel/ureters/blood vessels/nerves. Questions answered. Pt has anaphylactic rxn to PCN, vanc/gent/azith IV for surgical prophylaxis (national clindamycin shortage), SCDs for DVT ppx    Kimberly Cervantes MD  01:07 EDT  23      Electronically signed by Kimberly Cervantes MD at 23 0115       Kimberly Cervantes MD at 23 2145          23  21:45 EDT  Barbie CHOWDARY    SUBJECTIVE: Dorcasmikeolimpia is feeling more pain with ctx, also notes some pelvic pressure.     ASSESSMENTS:     /82   Pulse 88   Temp 97.8 øF (36.6 øC)   Resp 16   Ht 162.6 cm (64\")   Wt 113 kg (249 lb)   SpO2 97%   BMI 42.74 kg/mý     Fetal Heart Rate Assessment   Method: Fetal HR Assessment Method: external   Beats/min: Fetal HR (beats/min): 120   Baseline: Fetal HR Baseline: normal range   Varibility: Fetal HR Variability: moderate (amplitude range 6 to 25 bpm)   Accels: Fetal HR Accelerations: greater than/equal to 15 bpm, lasting at least 15 seconds   Decels: Fetal HR Decelerations: absent   Tracing Category:  I     Uterine Assessment   Method: Method: IUPC (intrauterine pressure catheter)   Frequency (min): Contraction Frequency (Minutes): 1.5-2.5   Ctx Count in 10 min:     Duration:     Intensity: Contraction Intensity: strong by palpation   Intensity by IUPC: Contraction Intensity (mmHg) by IUPC: 50-60   Resting Tone: Uterine Resting Tone: soft by palpation   Resting Tone by IUPC: Uterine Resting Tone (mmHg) by IUPC: 25     Presentation: vtx   Cervix: Exam by: Method: sterile exam per physician   Dilation:  9cm   Effacement: Cervical Effacement: 90%   Station:  -1            PLAN: Anesthesia contacted for epidural redose. Pitocin stopped due to variable decelerations, FHR now reassuring. IUPC in place. Will position in throne. BPs normal to mild range. Repeat SVE in 1-2h or PRN.    Kimberly Cervantes MD  21:45 " "EDT  08/20/23      Electronically signed by Kimberly Cervantes MD at 08/20/23 2147       Kimberly Cervantes MD at 08/20/23 1601          08/20/23  16:01 EDT  Barbie CHOWDARY    SUBJECTIVE: Barbie is resting in bed, comfortable with epidural.     ASSESSMENTS:     /62   Pulse 77   Temp 98 øF (36.7 øC) (Oral)   Resp 18   Ht 162.6 cm (64\")   Wt 113 kg (249 lb)   SpO2 97%   BMI 42.74 kg/mý     Fetal Heart Rate Assessment   Method: Fetal HR Assessment Method: external   Beats/min: Fetal HR (beats/min): 120   Baseline: Fetal HR Baseline: normal range   Varibility: Fetal HR Variability: moderate (amplitude range 6 to 25 bpm)   Accels: Fetal HR Accelerations: greater than/equal to 15 bpm, lasting at least 15 seconds   Decels: Fetal HR Decelerations: absent   Tracing Category:  I     Uterine Assessment   Method: Method: external tocotransducer   Frequency (min): Contraction Frequency (Minutes): poor tracing   Ctx Count in 10 min:     Duration:     Intensity: Contraction Intensity: no contractions   Intensity by IUPC:     Resting Tone: Uterine Resting Tone: soft by palpation   Resting Tone by IUPC:       Presentation: vtx   Cervix: Exam by: Method: sterile exam per physician   Dilation:  5cm   Effacement: 80%   Station:  -1            PLAN: FHR reassuring. BP wnl, magnesium infusing at 2g/hr. Pitocin infusing at 26 milliunits/minute, IUPC placed. Repeat SVE q2-4h or PRN.    Kimberly Cervantes MD  16:01 EDT  08/20/23      Electronically signed by Kimberly Cervantes MD at 08/20/23 1602       Kimberly Cervantes MD at 08/20/23 1159          08/20/23  11:59 EDT  Barbie CHOWDARY    SUBJECTIVE: Barbie is resting in bed, s/p FB, no complaints.     ASSESSMENTS:     /91   Pulse 82   Temp 98.1 øF (36.7 øC) (Oral)   Resp 16   Ht 162.6 cm (64\")   Wt 113 kg (249 lb)   SpO2 96%   BMI 42.74 kg/mý     Fetal Heart Rate Assessment   Method: Fetal HR Assessment Method: external   Beats/min: Fetal HR (beats/min): 130   Baseline: " "Fetal HR Baseline: normal range   Varibility: Fetal HR Variability: moderate (amplitude range 6 to 25 bpm)   Accels: Fetal HR Accelerations: greater than/equal to 15 bpm, lasting at least 15 seconds   Decels: Fetal HR Decelerations: absent   Tracing Category:  I     Uterine Assessment   Method: Method: external tocotransducer   Frequency (min): Contraction Frequency (Minutes): 2-4   Ctx Count in 10 min:     Duration:     Intensity: Contraction Intensity: no contractions   Intensity by IUPC:     Resting Tone: Uterine Resting Tone: soft by palpation   Resting Tone by IUPC:       Presentation: vtx   Cervix: Exam by: Method: sterile exam per physician   Dilation:  5cm   Effacement: Cervical Effacement: 70%   Station:  -2            PLAN: AROM performed, clear fluid. GBS cx result back and neg, will discontinue vancomycin. Continue magnesium sulfate at 2g/hr for seizure prophylaxis. Pitocin infusing per protocol. BPs occasionally severe range, labetalol protocol ordered PRN. Epidural as desires. Repeat SVE q2-4h or PRN.    Kimberly Cervantes MD  11:59 EDT  08/20/23      Electronically signed by Kimberly Cervantes MD at 08/20/23 1205       Kimberly Cervantes MD at 08/19/23 1600          08/19/23  17:48 EDT  Barbie CHOWDARY    SUBJECTIVE: Pt moved to LD to begin IOL due to persistent severe range BPs. Pt was discussed with MFM per laborist who recommended delivery. FB not yet placed, tolerating magnesium well.     ASSESSMENTS:     /84   Pulse 88   Temp 98.2 øF (36.8 øC) (Oral)   Resp 16   Ht 162.6 cm (64\")   Wt 113 kg (249 lb)   SpO2 97%   BMI 42.74 kg/mý     Fetal Heart Rate Assessment   Method: Fetal HR Assessment Method: external   Beats/min: Fetal HR (beats/min): 140   Baseline: Fetal HR Baseline: normal range   Varibility: Fetal HR Variability: moderate (amplitude range 6 to 25 bpm)   Accels: Fetal HR Accelerations: greater than/equal to 15 bpm, lasting at least 15 seconds   Decels: Fetal HR Decelerations: absent "   Tracing Category:  I     Uterine Assessment   Method: Method: external tocotransducer   Frequency (min): Contraction Frequency (Minutes): x1   Ctx Count in 10 min:     Duration:     Intensity: Contraction Intensity: no contractions   Intensity by IUPC:     Resting Tone: Uterine Resting Tone: soft by palpation   Resting Tone by IUPC:            PLAN: Will begin IOL with FB for cervical ripening in the setting of pre-eclampsia with severe features. Plan for low dose pitocin overnight, followed by pitocin per protocol at 0500 tomorrow. Vancomycin for GBS ppx given GBS status unknown, prematurity, and PCN allergy (anaphylaxis). S/p magnesium bolus of 4g, continue magnesium infusion at 2g/hr throughout IOL and for 24h PP for seizure prophylaxis. Labetalol protocol PRN.    Kimberly Cervantes MD  17:48 EDT  23      Electronically signed by Kimberly Cervantes MD at 23 7050       Kenny Trevino MD at 23 1353           [facility]rez Valentin [facility]  : 2000  MRN: [id number]  CSN: [id number]    Hospital Day: 2    CC: hospital follow-up for Pre-eclampsia     Antepartum Progress Note    Subjective   Barbie currently denies a headache, but has had then the last couple of days.  She denies current vision changes and RUQ pain.    Denies chest pain and SOA.              Objective     Min/max vitals past 24 hours:   Temp  Min: 97.9 øF (36.6 øC)  Max: 98.9 øF (37.2 øC)  BP  Min: 139/95  Max: 173/102  Pulse  Min: 78  Max: 103  Resp  Min: 16  Max: 18         General: well developed; well nourished   Heart: regular rate and rhythm   Lungs: breathing is unlabored  clear to auscultation bilaterally   Abdomen: soft, non-tender; no masses  Normal findings: bowel sounds normal   FHT's: reassuring and category 1   Cervix: was not checked.   Contractions: none         Assessment   IUP at 35w4d  Severe pre-eclampsia     Plan   Barbie has had severe sever range blood pressures in the last 24 hours ruling her in for severe  pre-eclampsia.     Will move to labour almodovar and initiate magnesium for seizure prophylaxis.  Antihypertensives prn  Insert cervical witt balloon.  Pitocin per on-call physician.   ABX for GBS unknown               Kenny Trevino MD  8/19/2023  13:54 EDT           Electronically signed by Kenny Trevino MD at 08/19/23 3949

## 2023-08-21 NOTE — OP NOTE
Primary Low Transverse  Section Procedure Note    Barbie CHOWDARY    2025     Indications: 1. IUP at 35w6d   2. Pre-eclampsia with severe features   3. Arrest of dilation   4. Class 3 obesity (BMI 42)    Pre-operative Diagnosis: 35w6d    Post-operative Diagnosis: same    Findings: VMI in vertex presentation; normal appearing maternal anatomy    Birth Information  YOB: 2025   Time of birth: 1:57 AM   Delivering clinician: Kimberly Cervantes   Sex: male   Delivery type: , Low Transverse   Breech type (if applicable):     Observed anomalies/comments:         Estimated Blood Loss:  800cc (QBL not yet calculated)           Drains: Fam                 Specimens: placenta (not sent to pathology)               Complications:  None; patient tolerated the procedure well.           Disposition: PACU - hemodynamically stable.           Condition: stable    Surgeon: Kimberly Cervantes MD     Assistants: scrub tech    Anesthesia: Epidural anesthesia    ASA Class: 2    Procedure Details   The patient was seen in the Labor and Delivery Room. The risks, benefits, complications, treatment options, and expected outcomes were discussed with the patient.  The patient concurred with the proposed plan, giving informed consent.  The site of surgery was properly noted. The patient was taken to the Operating Room, identified as Barbie CHOWDARY and the procedure verified as  Delivery. A Time Out was held and the above information confirmed.    The patient was taken to the operating room where epidural anesthesia was found to be adequate. She was prepped and draped in the usual sterile fashion in the dorsal supine position with a leftward tilt. A Pfannenstiel skin incision was made with the scalpel and carried through to the underlying layer of fascia using the bovie. The fascia was then nicked in the midline, and the fascial incision was extended laterally. The superior aspect of the fascial incision was  then grasped with Kochers, elevated, and the underlying rectus muscles were dissected off bluntly and sharply. In a similar fashion, the inferior aspect of the fascial incision was grasped with the Kochers, elevated, and the underlying rectus muscles were dissected off bluntly and sharply. The rectus muscles were then  in the midline and the peritoneum was identified. The peritoneum was entered bluntly, and this incision was extended superiorly and inferiorly with good visualization of underlying structures.  The bladder blade was then inserted. A bladder flap was not turned down. The lower uterine segment was identified and a low transverse uterine incision was made using the scalpel. Delivered from vertex presentation was a  Measurements  Weight (oz): 106.35    Length (in): 19.5    Head circumference (in):      Chest circumference (in):    with apgars scores of         APGARS  One minute Five minutes Ten minutes Fifteen minutes Twenty minutes   Skin color: 0   1             Heart rate: 2   2             Grimace: 2   2              Muscle tone: 1   1              Breathin   2              Totals: 6   8              . The nose and mouth were suctioned, the cord was clamped and cut, and the infant was handed off to the awaiting Delivery Room Team. Cord blood was then obtained. The placenta was removed intact and appeared normal. The uterus was then exteriorized from the abdominal cavity and cleared of all clots and debris. The uterine outline, tubes and ovaries appeared normal. The hysterotomy was then closed using 1 Monocryl in a running, locked fashion. Hemostasis was observed.  The uterus was placed back inside the abdominal cavity, and the gutters were cleared of clots and debris. The hysterotomy was again reexamined and excellent hemostasis continued to be noted. The fascia was then reapproximated with running sutures of 0 PDS. The incision was then irrigated, and the subcutaneous tissue was  reapproximated with 3-0 plain. The skin was reapproximated with 4-0 Monocryl and Skin glue.    Instrument, sponge, and needle counts were correct prior the abdominal closure and at the conclusion of the case.         Kimberly Cervantes MD  02:36 EDT  8/21/2023

## 2023-08-21 NOTE — PROGRESS NOTES
JASON Maguire  Obstetric Progress Note    Chief Complaint: POD 0    Subjective   Feeling well. Pain controlled. Breezy diet +amb. Lochia appr.     Objective     Vital Signs Range for the last 24 hours  Temp:  [97.8 øF (36.6 øC)-99.2 øF (37.3 øC)] 98.9 øF (37.2 øC)   BP: (0-168)/(0-100) 140/77   Heart Rate:  [75-99] 78   Resp:  [16-18] 16   SpO2:  [94 %-100 %] 96 %       Device (Oxygen Therapy): room air       Intake/Output last 24 hours:      Intake/Output Summary (Last 24 hours) at 8/21/2023 0902  Last data filed at 8/21/2023 0830  Gross per 24 hour   Intake 6264.17 ml   Output 4415 ml   Net 1849.17 ml       Intake/Output this shift:    I/O this shift:  In: 4914.2 [I.V.:4914.2]  Out: 725 [Urine:725]    Physical Exam:  General: No acute distress   Heart RRR   Lungs CTAB     Abdomen Soft, appropriately tender, fundus firm, incision CDI   Extremities Exam of extremities: pedal edema 1 +       Laboratory Results:    WBC   Date Value Ref Range Status   08/21/2023 17.71 (H) 3.40 - 10.80 10*3/mm3 Final     RBC   Date Value Ref Range Status   08/21/2023 4.02 3.77 - 5.28 10*6/mm3 Final     Hemoglobin   Date Value Ref Range Status   08/21/2023 11.3 (L) 12.0 - 15.9 g/dL Final     Hematocrit   Date Value Ref Range Status   08/21/2023 35.4 34.0 - 46.6 % Final     MCV   Date Value Ref Range Status   08/21/2023 88.1 79.0 - 97.0 fL Final     MCH   Date Value Ref Range Status   08/21/2023 28.1 26.6 - 33.0 pg Final     MCHC   Date Value Ref Range Status   08/21/2023 31.9 31.5 - 35.7 g/dL Final     RDW   Date Value Ref Range Status   08/21/2023 13.9 12.3 - 15.4 % Final     RDW-SD   Date Value Ref Range Status   08/21/2023 44.4 37.0 - 54.0 fl Final     MPV   Date Value Ref Range Status   08/21/2023 10.3 6.0 - 12.0 fL Final     Platelets   Date Value Ref Range Status   08/21/2023 215 140 - 450 10*3/mm3 Final     Neutrophil %   Date Value Ref Range Status   08/21/2023 87.5 (H) 42.7 - 76.0 % Final     Lymphocyte %   Date Value Ref Range  Status   08/21/2023 8.6 (L) 19.6 - 45.3 % Final     Monocyte %   Date Value Ref Range Status   08/21/2023 2.8 (L) 5.0 - 12.0 % Final     Eosinophil %   Date Value Ref Range Status   08/21/2023 0.2 (L) 0.3 - 6.2 % Final     Basophil %   Date Value Ref Range Status   08/21/2023 0.2 0.0 - 1.5 % Final     Immature Grans %   Date Value Ref Range Status   08/21/2023 0.7 (H) 0.0 - 0.5 % Final     Neutrophils, Absolute   Date Value Ref Range Status   08/21/2023 15.51 (H) 1.70 - 7.00 10*3/mm3 Final     Lymphocytes, Absolute   Date Value Ref Range Status   08/21/2023 1.52 0.70 - 3.10 10*3/mm3 Final     Monocytes, Absolute   Date Value Ref Range Status   08/21/2023 0.50 0.10 - 0.90 10*3/mm3 Final     Eosinophils, Absolute   Date Value Ref Range Status   08/21/2023 0.03 0.00 - 0.40 10*3/mm3 Final     Basophils, Absolute   Date Value Ref Range Status   08/21/2023 0.03 0.00 - 0.20 10*3/mm3 Final     Immature Grans, Absolute   Date Value Ref Range Status   08/21/2023 0.12 (H) 0.00 - 0.05 10*3/mm3 Final     nRBC   Date Value Ref Range Status   08/21/2023 0.0 0.0 - 0.2 /100 WBC Final         Assessment/Plan: POD 0 s/p PCD  RPOC advance care  2. S Pre-E: cont mag x 24 hr. No s/s worsening disease. Rpt labs in am. Bp controlled on Nif 30 XL bid. UOP excellent  3. Male infant          Connie Nguyen MD  8/21/2023  09:02 EDT

## 2023-08-21 NOTE — PROGRESS NOTES
"08/20/23  21:45 EDT  Barbie CHOWDARY    SUBJECTIVE: Barbie is feeling more pain with ctx, also notes some pelvic pressure.     ASSESSMENTS:     /82   Pulse 88   Temp 97.8 øF (36.6 øC)   Resp 16   Ht 162.6 cm (64\")   Wt 113 kg (249 lb)   SpO2 97%   BMI 42.74 kg/mý     Fetal Heart Rate Assessment   Method: Fetal HR Assessment Method: external   Beats/min: Fetal HR (beats/min): 120   Baseline: Fetal HR Baseline: normal range   Varibility: Fetal HR Variability: moderate (amplitude range 6 to 25 bpm)   Accels: Fetal HR Accelerations: greater than/equal to 15 bpm, lasting at least 15 seconds   Decels: Fetal HR Decelerations: absent   Tracing Category:  I     Uterine Assessment   Method: Method: IUPC (intrauterine pressure catheter)   Frequency (min): Contraction Frequency (Minutes): 1.5-2.5   Ctx Count in 10 min:     Duration:     Intensity: Contraction Intensity: strong by palpation   Intensity by IUPC: Contraction Intensity (mmHg) by IUPC: 50-60   Resting Tone: Uterine Resting Tone: soft by palpation   Resting Tone by IUPC: Uterine Resting Tone (mmHg) by IUPC: 25     Presentation: vtx   Cervix: Exam by: Method: sterile exam per physician   Dilation:  9cm   Effacement: Cervical Effacement: 90%   Station:  -1            PLAN: Anesthesia contacted for epidural redose. Pitocin stopped due to variable decelerations, FHR now reassuring. IUPC in place. Will position in throne. BPs normal to mild range. Repeat SVE in 1-2h or PRN.    Kimberly Cervantes MD  21:45 EDT  08/20/23    "

## 2023-08-21 NOTE — LACTATION NOTE
08/21/23 1000   Maternal Information   Person Making Referral physician  (Han)   Maternal Reason for Referral no prior breastfeeding experience;separation from infant   Infant Reason for Referral 35-37 weeks gestation;NICU admission   Maternal Assessment   Breast Size Issue none   Breast Shape Bilateral:;round   Breast Density Bilateral:;soft   Nipples Bilateral:;short   Left Nipple Symptoms intact;nontender;other (see comments)  (piercing scarring present)   Right Nipple Symptoms intact;nontender;other (see comments)  (piercing scarring present)   Maternal Infant Feeding   Maternal Emotional State independent;receptive;relaxed   Milk Expression/Equipment   Breast Pump Type double electric, hospital grade;double electric, personal  (guy stride, handsfree medela)   Breast Pump Flange Type hard   Breast Pump Flange Size 24 mm   Equipment for Home Use breast pump borrowed  (pump for preemie contract discussed; family verbalized understanding to return borrowed pump at time of baby's discharge)   Breast Pumping   Breast Pumping Interventions early pumping promoted;frequent pumping encouraged  (at baby's feeding times, to encourage breastmilk production)     Breastmilk production education discussed. Pumping encouraged every three hours, or at baby's feeding times for optimal milk initiation/production. Cleaning and sterilizing education provided. All questions answered at this time, PRN Lactation Consultant/Clinic contact encouraged.

## 2023-08-21 NOTE — ANESTHESIA POSTPROCEDURE EVALUATION
Patient: Barbie CHOWDARY    Procedure Summary       Date: 23 Room / Location: [facility] LABOR DELIVERY 2 /  [facility]OR DELIVERY    Anesthesia Start: 1333 Anesthesia Stop: 23 023    Procedure:  SECTION PRIMARY (Abdomen) Diagnosis:     Surgeons: Kimberly Cervantes MD Provider: Biju Chatman MD    Anesthesia Type: epidural ASA Status: 2 - Emergent            Anesthesia Type: epidural    Vitals  Vitals Value Taken Time   /87 23 1031   Temp 98.9 øF (37.2 øC) 23 0739   Pulse 83 23 1031   Resp 18 23 0930   SpO2 98 % 23 0930   Vitals shown include unvalidated device data.        Post Anesthesia Care and Evaluation    Patient location during evaluation: bedside  Patient participation: complete - patient participated  Level of consciousness: awake and alert  Pain management: adequate    Airway patency: patent  Anesthetic complications: No anesthetic complications    Cardiovascular status: acceptable  Respiratory status: acceptable  Hydration status: acceptable  Post Neuraxial Block status: Motor and sensory function returned to baseline and No signs or symptoms of PDPH

## 2023-08-21 NOTE — PROGRESS NOTES
"23  01:07 EDT  Barbie CHOWDRAY    SUBJECTIVE: Barbie is tired, does not know how much longer she can continue with the induction process.     ASSESSMENTS:     /72   Pulse 88   Temp 99.2 øF (37.3 øC) (Oral)   Resp 16   Ht 162.6 cm (64\")   Wt 113 kg (249 lb)   SpO2 97%   BMI 42.74 kg/mý     Fetal Heart Rate Assessment   Method: Fetal HR Assessment Method: external   Beats/min: Fetal HR (beats/min): 115   Baseline: Fetal HR Baseline: normal range   Varibility: Fetal HR Variability: moderate (amplitude range 6 to 25 bpm)   Accels: Fetal HR Accelerations: greater than/equal to 15 bpm, lasting at least 15 seconds   Decels: Absent   Tracing Category:  I     Uterine Assessment   Method: Method: IUPC (intrauterine pressure catheter)   Frequency (min): Contraction Frequency (Minutes): 2-3   Ctx Count in 10 min:     Duration:     Intensity: Contraction Intensity: strong by palpation   Intensity by IUPC: Contraction Intensity (mmHg) by IUPC: 50-60   Resting Tone: Uterine Resting Tone: soft by palpation   Resting Tone by IUPC: Uterine Resting Tone (mmHg) by IUPC: 25     Presentation: vtx   Cervix: Exam by: Method: sterile exam per physician   Dilation:  8cm - more cervix felt circumferentially on this exam   Effacement: 90%   Station:  -1            PLAN: SVE without change over several exams. Pitocin was maxed at 30 milliunits/minute, stopped for a period of time due to decelerations, restarted at half. Ctx have been intermittently adequate per IUPC tracing. Reviewed options of continuing with IOL vs proceeding with delivery due to arrest of dilation. R/B/A reviewed, questions answered, pt requests . Discussed surgical risks including blood loss, infection, and possible injury to bladder/bowel/ureters/blood vessels/nerves. Questions answered. Pt has anaphylactic rxn to PCN, vanc/gent/azith IV for surgical prophylaxis (national clindamycin shortage), SCDs for DVT ppx    Kimberly Cervantes MD  01:07 " EDT  08/21/23

## 2023-08-22 LAB
ALP SERPL-CCNC: 150 U/L (ref 39–117)
ALT SERPL W P-5'-P-CCNC: 9 U/L (ref 1–33)
AST SERPL-CCNC: 16 U/L (ref 1–32)
BILIRUB SERPL-MCNC: 0.2 MG/DL (ref 0–1.2)
CREAT SERPL-MCNC: 0.44 MG/DL (ref 0.57–1)
DEPRECATED RDW RBC AUTO: 45.4 FL (ref 37–54)
ERYTHROCYTE [DISTWIDTH] IN BLOOD BY AUTOMATED COUNT: 14.6 % (ref 12.3–15.4)
HCT VFR BLD AUTO: 33.9 % (ref 34–46.6)
HGB BLD-MCNC: 11.2 G/DL (ref 12–15.9)
LDH SERPL-CCNC: 280 U/L (ref 135–214)
MCH RBC QN AUTO: 28.6 PG (ref 26.6–33)
MCHC RBC AUTO-ENTMCNC: 33 G/DL (ref 31.5–35.7)
MCV RBC AUTO: 86.5 FL (ref 79–97)
PLATELET # BLD AUTO: 249 10*3/MM3 (ref 140–450)
PMV BLD AUTO: 9.9 FL (ref 6–12)
RBC # BLD AUTO: 3.92 10*6/MM3 (ref 3.77–5.28)
URATE SERPL-MCNC: 4.5 MG/DL (ref 2.4–5.7)
WBC NRBC COR # BLD: 15.38 10*3/MM3 (ref 3.4–10.8)

## 2023-08-22 PROCEDURE — 84450 TRANSFERASE (AST) (SGOT): CPT | Performed by: OBSTETRICS & GYNECOLOGY

## 2023-08-22 PROCEDURE — 82247 BILIRUBIN TOTAL: CPT | Performed by: OBSTETRICS & GYNECOLOGY

## 2023-08-22 PROCEDURE — 84550 ASSAY OF BLOOD/URIC ACID: CPT | Performed by: OBSTETRICS & GYNECOLOGY

## 2023-08-22 PROCEDURE — 25010000002 KETOROLAC TROMETHAMINE PER 15 MG: Performed by: OBSTETRICS & GYNECOLOGY

## 2023-08-22 PROCEDURE — 83615 LACTATE (LD) (LDH) ENZYME: CPT | Performed by: OBSTETRICS & GYNECOLOGY

## 2023-08-22 PROCEDURE — 84460 ALANINE AMINO (ALT) (SGPT): CPT | Performed by: OBSTETRICS & GYNECOLOGY

## 2023-08-22 PROCEDURE — 84075 ASSAY ALKALINE PHOSPHATASE: CPT | Performed by: OBSTETRICS & GYNECOLOGY

## 2023-08-22 PROCEDURE — 82565 ASSAY OF CREATININE: CPT | Performed by: OBSTETRICS & GYNECOLOGY

## 2023-08-22 PROCEDURE — 85027 COMPLETE CBC AUTOMATED: CPT | Performed by: OBSTETRICS & GYNECOLOGY

## 2023-08-22 RX ORDER — LABETALOL 200 MG/1
200 TABLET, FILM COATED ORAL 2 TIMES DAILY
Status: DISCONTINUED | OUTPATIENT
Start: 2023-08-22 | End: 2023-08-23

## 2023-08-22 RX ORDER — NIFEDIPINE 60 MG/1
60 TABLET, EXTENDED RELEASE ORAL 2 TIMES DAILY
Status: DISCONTINUED | OUTPATIENT
Start: 2023-08-22 | End: 2023-08-24 | Stop reason: HOSPADM

## 2023-08-22 RX ADMIN — IBUPROFEN 600 MG: 600 TABLET ORAL at 10:00

## 2023-08-22 RX ADMIN — NIFEDIPINE 10 MG: 10 CAPSULE ORAL at 19:58

## 2023-08-22 RX ADMIN — Medication 10 ML: at 08:30

## 2023-08-22 RX ADMIN — Medication 10 ML: at 21:22

## 2023-08-22 RX ADMIN — IBUPROFEN 600 MG: 600 TABLET ORAL at 21:22

## 2023-08-22 RX ADMIN — ACETAMINOPHEN 650 MG: 325 TABLET ORAL at 19:39

## 2023-08-22 RX ADMIN — PRENATAL VITAMINS-IRON FUMARATE 27 MG IRON-FOLIC ACID 0.8 MG TABLET 1 TABLET: at 08:28

## 2023-08-22 RX ADMIN — ACETAMINOPHEN 650 MG: 325 TABLET ORAL at 14:07

## 2023-08-22 RX ADMIN — NIFEDIPINE 10 MG: 10 CAPSULE ORAL at 15:32

## 2023-08-22 RX ADMIN — KETOROLAC TROMETHAMINE 15 MG: 15 INJECTION, SOLUTION INTRAMUSCULAR; INTRAVENOUS at 05:02

## 2023-08-22 RX ADMIN — LABETALOL HYDROCHLORIDE 200 MG: 200 TABLET, FILM COATED ORAL at 20:41

## 2023-08-22 RX ADMIN — NIFEDIPINE 60 MG: 30 TABLET, EXTENDED RELEASE ORAL at 21:21

## 2023-08-22 RX ADMIN — ACETAMINOPHEN 650 MG: 325 TABLET ORAL at 06:33

## 2023-08-22 RX ADMIN — NIFEDIPINE 30 MG: 30 TABLET, EXTENDED RELEASE ORAL at 08:28

## 2023-08-22 RX ADMIN — ACETAMINOPHEN 1000 MG: 500 TABLET ORAL at 01:27

## 2023-08-22 RX ADMIN — IBUPROFEN 600 MG: 600 TABLET ORAL at 16:39

## 2023-08-22 NOTE — PROGRESS NOTES
2023    Name:Barbie CHOWDARY    MR#:[id number]     PROGRESS NOTE:  Post-Op 1 S/P        Subjective   23 y.o. yo Female  s/p CS at 35w6d doing well. Pain well controlled, lochia appropriate      Preeclampsia, severe, third trimester    S/P primary low transverse         Objective    Vitals  Temp:  Temp:  [97.4 øF (36.3 øC)-98.4 øF (36.9 øC)] 98.1 øF (36.7 øC)  Temp src: Oral  BP:  BP: (120-159)/(69-91) 150/82  Pulse:  Heart Rate:  [] 78  RR:   Resp:  [12-16] 16    General Awake, alert, no distress  Abdomen Soft, nondistended, fundus firm, below umbilicus, appropriately tender  Incision  Intact, no erythema or exudate  Extremities Calves NT bilaterally     I/O last 3 completed shifts:  In: 7566.2 [I.V.:7566.2]  Out: 6565 [Urine:5775; Blood:790]    Lab Results   Component Value Date    WBC 15.38 (H) 2023    HGB 11.2 (L) 2023    HCT 33.9 (L) 2023    MCV 86.5 2023     2023    URICACID 4.5 2023    AST 16 2023    ALT 9 2023    HEPBSAG Non-Reactive 2023     Results from last 7 days   Lab Units 23  1835   ABO TYPING  A   RH TYPING  Positive   ANTIBODY SCREEN  Negative       Infant:   male       Assessment   1.  POD 1 from  Section  2. preE with severe features    Plan: Doing well.    Discontinue IV, advance diet, may shower.  S/p Mag sulfate for seizure prophylaxis x 24 hours  BP well controlled on Nif 30 XL bid.  Normal and mild range x 24 hours. Will continue to monitor closely.            Allison Canavan, MD  2023 09:56 EDT

## 2023-08-23 RX ORDER — FUROSEMIDE 10 MG/ML
40 INJECTION INTRAMUSCULAR; INTRAVENOUS ONCE
Status: DISCONTINUED | OUTPATIENT
Start: 2023-08-23 | End: 2023-08-24 | Stop reason: HOSPADM

## 2023-08-23 RX ADMIN — NIFEDIPINE 60 MG: 30 TABLET, EXTENDED RELEASE ORAL at 08:11

## 2023-08-23 RX ADMIN — PRENATAL VITAMINS-IRON FUMARATE 27 MG IRON-FOLIC ACID 0.8 MG TABLET 1 TABLET: at 08:11

## 2023-08-23 RX ADMIN — Medication 10 ML: at 08:12

## 2023-08-23 RX ADMIN — ACETAMINOPHEN 650 MG: 325 TABLET ORAL at 12:55

## 2023-08-23 RX ADMIN — ACETAMINOPHEN 650 MG: 325 TABLET ORAL at 00:40

## 2023-08-23 RX ADMIN — IBUPROFEN 600 MG: 600 TABLET ORAL at 10:23

## 2023-08-23 RX ADMIN — ACETAMINOPHEN 650 MG: 325 TABLET ORAL at 18:21

## 2023-08-23 RX ADMIN — IBUPROFEN 600 MG: 600 TABLET ORAL at 22:36

## 2023-08-23 RX ADMIN — NIFEDIPINE 60 MG: 30 TABLET, EXTENDED RELEASE ORAL at 21:00

## 2023-08-23 RX ADMIN — ACETAMINOPHEN 650 MG: 325 TABLET ORAL at 06:56

## 2023-08-23 RX ADMIN — IBUPROFEN 600 MG: 600 TABLET ORAL at 04:43

## 2023-08-23 RX ADMIN — Medication 10 ML: at 08:14

## 2023-08-23 RX ADMIN — IBUPROFEN 600 MG: 600 TABLET ORAL at 15:42

## 2023-08-23 NOTE — PROGRESS NOTES
[facility]  Obstetric Progress Note    Chief Complaint: POD 2    Subjective   Feeling well. Pain controlled. Breezy diet +amb. Lochia appr. BP has been well controlled    Objective     Vital Signs Range for the last 24 hours  Temp:  [97.8 øF (36.6 øC)-98.6 øF (37 øC)] 98.2 øF (36.8 øC)   BP: (132-170)/() 139/90   Heart Rate:  [74-97] 97   Resp:  [16-18] 18   SpO2:  [98 %] 98 %               Intake/Output last 24 hours:      Intake/Output Summary (Last 24 hours) at 8/23/2023 1122  Last data filed at 8/23/2023 1000  Gross per 24 hour   Intake --   Output 1200 ml   Net -1200 ml       Intake/Output this shift:    I/O this shift:  In: -   Out: 200 [Urine:200]    Physical Exam:  General: No acute distress   Heart RRR   Lungs CTAB     Abdomen Soft, appropriately tender, fundus firm, incision CDI   Extremities Exam of extremities: pedal edema 2 +       Laboratory Results:    No new    Assessment/Plan: POD 2 s/p PCD  RPOC advance care  2. S Pre-E: cont Nifedipine 30 XL daily. Dc labetalol.   3. Lasix for edema  4. Infant in NICU        Connie Nguyen MD  8/23/2023  11:22 EDT

## 2023-08-23 NOTE — PAYOR COMM NOTE
"Barbie CHOWDARY (23 y.o. Female) Update  [id number]       Date of Birth   2000    Social Security Number       Address   [address] [place] [address]    Home Phone   [phone]    MRN   [id number]       Yazdanism   Jew    Marital Status                               Admission Date   8/18/23    Admission Type   Elective    Admitting Provider   Connie Nguyen MD    Attending Provider   Connie Nguyen MD    Department, Room/Bed   [facility] MOTHER BABY 4B, N426/1       Discharge Date       Discharge Disposition       Discharge Destination                                 Attending Provider: Connie Nguyen MD    Allergies: Penicillins    Isolation: None   Infection: None   Code Status: CPR    Ht: 162.6 cm (64\")   Wt: 113 kg (249 lb)    Admission Cmt: None   Principal Problem: None                  Active Insurance as of 8/18/2023       Primary Coverage       Payor Plan Insurance Group Employer/Plan Group    ANTHEM BLUE CROSS ANTHEM BLUE CROSS BLUE SHIELD PPO [id number]       Payor Plan Address Payor Plan Phone Number Payor Plan Fax Number Effective Dates    [address] [phone]  7/1/2021 - None Entered    [place] [address]         Subscriber Name Subscriber Birth Date Member ID       BARBIE CHOWDARY 2000 [id number]                     Emergency Contacts            No emergency contacts on file.              Insurance Information                  ANTHEM BLUE CROSS/ANTHEM BLUE CROSS BLUE SHIELD PPO Phone: [phone]    Subscriber: Barbie Chowdary Subscriber#: [id number]    Group#: [id number] Precert#: --          Current Facility-Administered Medications   Medication Dose Route Frequency Provider Last Rate Last Admin    acetaminophen (TYLENOL) tablet 650 mg  650 mg Oral Q6H Kimberly Cervantes MD   650 mg at 08/23/23 1255    aluminum-magnesium hydroxide-simethicone (MAALOX MAX) 400-400-40 MG/5ML suspension 15 mL  15 mL Oral Q4H PRN Billy, " Kimberly SOLARES MD        Or    calcium carbonate (TUMS) chewable tablet 500 mg (200 mg elemental)  1 tablet Oral Q4H PRN Kimberly Cervantes MD        benzocaine-menthol (DERMOPLAST) 20-0.5 % topical spray   Topical PRN Kimberly Cervantes MD        carboprost (HEMABATE) injection 250 mcg  250 mcg Intramuscular PRN Kimberly Cervantes MD        diphenhydrAMINE (BENADRYL) capsule 25 mg  25 mg Oral Q4H PRN Kimberly Cervantes MD        docusate sodium (COLACE) capsule 100 mg  100 mg Oral BID PRN Kimberly Cervantes MD        ePHEDrine Sulfate (Pressors) 5 MG/ML injection 10 mg  10 mg Intravenous Q10 Min PRN Biju Chatman MD        famotidine (PEPCID) injection 20 mg  20 mg Intravenous Once PRN Biju Chatman MD        fentaNYL citrate (PF) (SUBLIMAZE) injection 50 mcg  50 mcg Intravenous Q1H PRN Willem Martinez III, MD   50 mcg at 08/19/23 2324    furosemide (LASIX) injection 40 mg  40 mg Intravenous Once Connie Nguyen MD        hydrALAZINE (APRESOLINE) injection 5-10 mg  5-10 mg Intravenous Q20 Min PRN Kenny Trevino MD        Or    NIFEdipine (PROCARDIA) capsule 10-20 mg  10-20 mg Oral Q20 Min PRN Kenny Trevino MD   10 mg at 08/22/23 1958    Hydrocortisone (Perianal) (ANUSOL-HC) 2.5 % rectal cream 1 application   1 application  Rectal PRN Kimberly Cervantes MD        ibuprofen (ADVIL,MOTRIN) tablet 600 mg  600 mg Oral Q6H Kimberly Cervantes MD   600 mg at 08/23/23 1023    lactated ringers infusion  125 mL/hr Intravenous Continuous Connie Nguyen MD   Stopped at 08/22/23 0200    lanolin topical 1 application   1 application  Topical Q1H PRN Kimberly Cervantes MD        lidocaine PF 1% (XYLOCAINE) injection 0.5 mL  0.5 mL Intradermal Once PRN Connie Nguyen MD        lidocaine PF 1% (XYLOCAINE) injection 0.5 mL  0.5 mL Intradermal Once PRN Kenny Trevino MD        methylergonovine (METHERGINE) injection 200 mcg  200 mcg Intramuscular PRN Kimberly Cervantes MD        metoclopramide (REGLAN) injection 10 mg  10 mg  Intravenous Once PRN Biju Chatman MD        miSOPROStol (CYTOTEC) tablet 600 mcg  600 mcg Oral PRN Kimberly Cervantes MD        NIFEdipine XL (PROCARDIA XL) 24 hr tablet 60 mg  60 mg Oral BID Canavan, Allison, MD   60 mg at 08/23/23 0811    ondansetron (ZOFRAN) tablet 4 mg  4 mg Oral Q6H PRN Kimberly Cervantes MD        Or    ondansetron (ZOFRAN) injection 4 mg  4 mg Intravenous Q6H PRN Kimberly Cervantes MD   4 mg at 08/21/23 0742    oxyCODONE (ROXICODONE) immediate release tablet 5 mg  5 mg Oral Q4H PRN Kimberly Cervantes MD        Or    oxyCODONE (ROXICODONE) immediate release tablet 10 mg  10 mg Oral Q4H PRN Kimberly Cervantes MD        oxytocin (PITOCIN) 30 units in 0.9% sodium chloride 500 mL (premix)  2-30 allyson-units/min Intravenous Titrated Kimberly Cervantes MD   Stopped at 08/21/23 0102    polyethylene glycol (MIRALAX) packet 17 g  17 g Oral Daily Kimberly Cervantes MD        prenatal vitamin tablet 1 tablet  1 tablet Oral Daily Kimberly Cervantes MD   1 tablet at 08/23/23 0811    promethazine (PHENERGAN) tablet 12.5 mg  12.5 mg Oral Q6H PRN Connie Nguyen MD        Or    promethazine (PHENERGAN) suppository 12.5 mg  12.5 mg Rectal Q6H PRN Connie Nguyen MD        simethicone (MYLICON) chewable tablet 80 mg  80 mg Oral 4x Daily PRN Kimberly Cervantes MD        sodium chloride 0.9 % flush 10 mL  10 mL Intravenous Q12H Connie Nguyen MD   10 mL at 08/23/23 0814    sodium chloride 0.9 % flush 10 mL  10 mL Intravenous PRN Connie Nguyen MD        sodium chloride 0.9 % flush 10 mL  10 mL Intravenous Q12H Kenny Trevino MD   10 mL at 08/23/23 0812    sodium chloride 0.9 % flush 10 mL  10 mL Intravenous PRN Kenny Trevino MD        sodium chloride 0.9 % infusion 40 mL  40 mL Intravenous PRN Connie Nguyen MD        sodium chloride 0.9 % infusion 40 mL  40 mL Intravenous PRN Kenny Trevino MD        witch hazel-glycerin (TUCKS) pad   Topical PRN Kimberly Cervantes MD         Lab Results (last 24 hours)        ** No results found for the last 24 hours. **          Imaging Results (Last 24 Hours)       ** No results found for the last 24 hours. **          Orders (last 24 hrs)        Start     Ordered    08/23/23 1215  furosemide (LASIX) injection 40 mg  Once         08/23/23 1125    08/22/23 2115  labetalol (NORMODYNE) tablet 200 mg  2 Times Daily,   Status:  Discontinued         08/22/23 2022 08/22/23 2100  NIFEdipine XL (PROCARDIA XL) 24 hr tablet 60 mg  2 Times Daily         08/22/23 1412    08/22/23 1000  ibuprofen (ADVIL,MOTRIN) tablet 600 mg  Every 6 Hours        See Hyperspace for full Linked Orders Report.    08/21/23 0457    08/22/23 0700  acetaminophen (TYLENOL) tablet 650 mg  Every 6 Hours        See Hyperspace for full Linked Orders Report.    08/21/23 0457    08/21/23 0900  polyethylene glycol (MIRALAX) packet 17 g  Daily         08/21/23 0457    08/21/23 0900  prenatal vitamin tablet 1 tablet  Daily         08/21/23 0457    08/21/23 0800  Ambulate Patient  2 Times Daily      Comments: After anesthesia wears off.    08/21/23 0457    08/21/23 0458  I/O  Every Shift       08/21/23 0457    08/21/23 0458  Ambulate Patient 3-5 times per day (with or without Fam)  Every Shift       08/21/23 0457    08/21/23 0457  oxyCODONE (ROXICODONE) immediate release tablet 5 mg  Every 4 Hours PRN        See Hyperspace for full Linked Orders Report.    08/21/23 0457    08/21/23 0457  oxyCODONE (ROXICODONE) immediate release tablet 10 mg  Every 4 Hours PRN        See Hyperspace for full Linked Orders Report.    08/21/23 0457    08/21/23 0457  ondansetron (ZOFRAN) tablet 4 mg  Every 6 Hours PRN        See Hyperspace for full Linked Orders Report.    08/21/23 0457    08/21/23 0457  ondansetron (ZOFRAN) injection 4 mg  Every 6 Hours PRN        See Hyperspace for full Linked Orders Report.    08/21/23 0457    08/21/23 0457  lanolin topical 1 application   Every 1 Hour PRN         08/21/23 0457    08/21/23 0457  carboprost  (HEMABATE) injection 250 mcg  As Needed         08/21/23 0457    08/21/23 0457  miSOPROStol (CYTOTEC) tablet 600 mcg  As Needed         08/21/23 0457    08/21/23 0457  methylergonovine (METHERGINE) injection 200 mcg  As Needed         08/21/23 0457    08/21/23 0457  diphenhydrAMINE (BENADRYL) capsule 25 mg  Every 4 Hours PRN         08/21/23 0457    08/21/23 0457  benzocaine-menthol (DERMOPLAST) 20-0.5 % topical spray  As Needed         08/21/23 0457    08/21/23 0457  witch hazel-glycerin (TUCKS) pad  As Needed         08/21/23 0457    08/21/23 0457  Hydrocortisone (Perianal) (ANUSOL-HC) 2.5 % rectal cream 1 application   As Needed         08/21/23 0457    08/21/23 0457  aluminum-magnesium hydroxide-simethicone (MAALOX MAX) 400-400-40 MG/5ML suspension 15 mL  Every 4 Hours PRN        See Hyperspace for full Linked Orders Report.    08/21/23 0457    08/21/23 0457  calcium carbonate (TUMS) chewable tablet 500 mg (200 mg elemental)  Every 4 Hours PRN        See Hyperspace for full Linked Orders Report.    08/21/23 0457    08/21/23 0457  simethicone (MYLICON) chewable tablet 80 mg  4 Times Daily PRN         08/21/23 0457    08/21/23 0457  docusate sodium (COLACE) capsule 100 mg  2 Times Daily PRN         08/21/23 0457    08/21/23 0000  Diet: Regular/House Diet; Regular Texture (IDDSI 7); Thin (IDDSI 0)         08/21/23 1121    08/20/23 1415  fentaNYL 1 mcg/mL, Ropivacaine 0.2% in 200mL NS epidural  Continuous         08/20/23 1325    08/20/23 1324  ePHEDrine Sulfate (Pressors) 5 MG/ML injection 10 mg  Every 10 Minutes PRN         08/20/23 1325    08/20/23 1324  metoclopramide (REGLAN) injection 10 mg  Once As Needed         08/20/23 1325    08/20/23 1324  famotidine (PEPCID) injection 20 mg  Once As Needed         08/20/23 1325    08/19/23 2310  fentaNYL citrate (PF) (SUBLIMAZE) injection 50 mcg  Every 1 Hour PRN         08/19/23 2311 08/19/23 1475  oxytocin (PITOCIN) 30 units in 0.9% sodium chloride 500 mL (premix)   Titrated         08/19/23 2145    08/19/23 2100  NIFEdipine XL (PROCARDIA XL) 24 hr tablet 30 mg  2 Times Daily,   Status:  Discontinued         08/19/23 1306    08/19/23 1600  lactated ringers infusion  Continuous         08/19/23 1505    08/19/23 1445  sodium chloride 0.9 % flush 10 mL  Every 12 Hours Scheduled         08/19/23 1353    08/19/23 1353  Strict I and O  Every Shift       08/19/23 1353    08/19/23 1352  hydrALAZINE (APRESOLINE) injection 5-10 mg  Every 20 Minutes PRN        See Hyperspace for full Linked Orders Report.    08/19/23 1353    08/19/23 1352  labetalol (NORMODYNE,TRANDATE) injection 20-80 mg  Every 10 Minutes PRN,   Status:  Discontinued        See Hyperspace for full Linked Orders Report.    08/19/23 1353    08/19/23 1352  NIFEdipine (PROCARDIA) capsule 10-20 mg  Every 20 Minutes PRN        See Hyperspace for full Linked Orders Report.    08/19/23 1353    08/19/23 1351  sodium chloride 0.9 % flush 10 mL  As Needed         08/19/23 1353    08/19/23 1351  sodium chloride 0.9 % infusion 40 mL  As Needed         08/19/23 1353    08/19/23 1351  lidocaine PF 1% (XYLOCAINE) injection 0.5 mL  Once As Needed         08/19/23 1353    08/18/23 2100  sodium chloride 0.9 % flush 10 mL  Every 12 Hours Scheduled         08/18/23 1603    08/18/23 1602  sodium chloride 0.9 % flush 10 mL  As Needed         08/18/23 1603    08/18/23 1602  sodium chloride 0.9 % infusion 40 mL  As Needed         08/18/23 1603    08/18/23 1602  lidocaine PF 1% (XYLOCAINE) injection 0.5 mL  Once As Needed         08/18/23 1603    08/18/23 1602  promethazine (PHENERGAN) tablet 12.5 mg  Every 6 Hours PRN        See Hyperspace for full Linked Orders Report.    08/18/23 1603    08/18/23 1602  promethazine (PHENERGAN) suppository 12.5 mg  Every 6 Hours PRN        See Hyperspace for full Linked Orders Report.    08/18/23 1603    08/18/23 0000  NIFEdipine XL (PROCARDIA XL) 30 MG 24 hr tablet  Every 12 Hours         08/18/23 3036     Unscheduled  Position Change - For Intra-Uterine Resusitation for Hypertonus, HyperStimulation or Non-Reassuring Fetal Status  As Needed       08/18/23 1603    Unscheduled  Position Change - For Intra-Uterine Resusitation for Hypertonus, HyperStimulation or Non-Reassuring Fetal Status  As Needed       08/19/23 1353    Unscheduled  Up with Assistance  As Needed       08/21/23 0457    Unscheduled  Bladder Scan if Patient Unable to Void 4-6 Hours After Catheter Removal  As Needed         08/21/23 0457    Unscheduled  Straight Cath Every 4-6 Hours As Needed If Patient is Unable to Void After 4-6 Hours, Bladder Scan Volume is Greater Than 500mL & Patient Has Symptoms of Bladder Discomfort / Distention  As Needed       08/21/23 0457    Unscheduled  Schedule / Prompt Voiding For Patients With Urinary Incontinence  As Needed       08/21/23 0457    Unscheduled  Abdominal Wound Care  As Needed      Comments: Postop day 1. Remove dressing and leave incision open to air.    08/21/23 0457    Unscheduled  KPad  As Needed      Comments: PRN pain    08/21/23 0457    Unscheduled  Apply Ice Pack to Perineum  As Needed      Comments: For 20 min q 2hrs    08/21/23 0457    Unscheduled  Apply Waffle Cushion  As Needed      Comments: PRN perineal discomfort    08/21/23 0457    Unscheduled  Chewing Gum  As Needed       08/21/23 0457    Unscheduled  Warm compress  As Needed       08/21/23 0457    Unscheduled  Apply ace wrap, tight bra, or binder  As Needed       08/21/23 0457    Unscheduled  Apply ice packs  As Needed       08/21/23 0457    --  famotidine (PEPCID) 10 MG tablet  Daily         08/18/23 1159    Signed and Held  Transfer to postpartum when discharge criteria met.  Until Discontinued         Signed and Held                     Physician Progress Notes (last 24 hours)        Connie Nguyen MD at 08/23/23 1122           [facility]  Obstetric Progress Note    Chief Complaint: POD 2    Subjective   Feeling well. Pain controlled.  Breezy diet +amb. Lochia appr. BP has been well controlled    Objective     Vital Signs Range for the last 24 hours  Temp:  [97.8 øF (36.6 øC)-98.6 øF (37 øC)] 98.2 øF (36.8 øC)   BP: (132-170)/() 139/90   Heart Rate:  [74-97] 97   Resp:  [16-18] 18   SpO2:  [98 %] 98 %               Intake/Output last 24 hours:      Intake/Output Summary (Last 24 hours) at 8/23/2023 1122  Last data filed at 8/23/2023 1000  Gross per 24 hour   Intake --   Output 1200 ml   Net -1200 ml       Intake/Output this shift:    I/O this shift:  In: -   Out: 200 [Urine:200]    Physical Exam:  General: No acute distress   Heart RRR   Lungs CTAB     Abdomen Soft, appropriately tender, fundus firm, incision CDI   Extremities Exam of extremities: pedal edema 2 +       Laboratory Results:    No new    Assessment/Plan: POD 2 s/p PCD  RPOC advance care  2. S Pre-E: cont Nifedipine 30 XL daily. Dc labetalol.   3. Lasix for edema  4. Infant in NICU        Connie Nguyen MD  8/23/2023  11:22 EDT        Electronically signed by Connie Nguyen MD at 08/23/23 1610

## 2023-08-24 VITALS
RESPIRATION RATE: 16 BRPM | HEIGHT: 64 IN | WEIGHT: 249 LBS | TEMPERATURE: 98.9 F | SYSTOLIC BLOOD PRESSURE: 124 MMHG | HEART RATE: 100 BPM | DIASTOLIC BLOOD PRESSURE: 86 MMHG | OXYGEN SATURATION: 98 % | BODY MASS INDEX: 42.51 KG/M2

## 2023-08-24 RX ORDER — IBUPROFEN 600 MG/1
600 TABLET ORAL EVERY 6 HOURS
Qty: 60 TABLET | Refills: 0 | Status: SHIPPED | OUTPATIENT
Start: 2023-08-24

## 2023-08-24 RX ORDER — OXYCODONE HYDROCHLORIDE 5 MG/1
5 TABLET ORAL EVERY 4 HOURS PRN
Qty: 8 TABLET | Refills: 0 | Status: SHIPPED | OUTPATIENT
Start: 2023-08-24 | End: 2023-08-28

## 2023-08-24 RX ORDER — PSEUDOEPHEDRINE HCL 30 MG
100 TABLET ORAL 2 TIMES DAILY PRN
Qty: 60 CAPSULE | Refills: 0 | Status: SHIPPED | OUTPATIENT
Start: 2023-08-24

## 2023-08-24 RX ORDER — NIFEDIPINE 30 MG/1
30 TABLET, EXTENDED RELEASE ORAL EVERY 12 HOURS
Qty: 60 TABLET | Refills: 0 | Status: SHIPPED | OUTPATIENT
Start: 2023-08-24

## 2023-08-24 RX ADMIN — ACETAMINOPHEN 650 MG: 325 TABLET ORAL at 05:56

## 2023-08-24 RX ADMIN — ACETAMINOPHEN 650 MG: 325 TABLET ORAL at 00:39

## 2023-08-24 RX ADMIN — IBUPROFEN 600 MG: 600 TABLET ORAL at 03:57

## 2023-08-24 RX ADMIN — IBUPROFEN 600 MG: 600 TABLET ORAL at 10:07

## 2023-08-24 RX ADMIN — ACETAMINOPHEN 650 MG: 325 TABLET ORAL at 08:15

## 2023-08-24 RX ADMIN — NIFEDIPINE 60 MG: 30 TABLET, EXTENDED RELEASE ORAL at 08:15

## 2023-08-24 RX ADMIN — ACETAMINOPHEN 650 MG: 325 TABLET ORAL at 13:03

## 2023-08-24 RX ADMIN — PRENATAL VITAMINS-IRON FUMARATE 27 MG IRON-FOLIC ACID 0.8 MG TABLET 1 TABLET: at 08:15

## 2023-08-24 NOTE — PLAN OF CARE
Goal Outcome Evaluation: Vitals stable. + void + BM. Fundus firm u/1-2. Small scant bleeding no clots. Ambulating well. No s/s infection

## 2023-08-24 NOTE — DISCHARGE SUMMARY
[facility]  Discharge Summary      Patient: Barbie CHOWDARY            : 2000  MRN: [id number]  CSN: [id number]  Consult:   Consults       No orders found from 2023 to 2023.               Gestational Age at Discharge:  35w6d, delivered      Admission  Diagnosis: Severe Pre-Eclampsia    Discharge Diagnosis:   Patient Active Problem List   Diagnosis    Preeclampsia, severe, third trimester    S/P primary low transverse        Date of Admission: 2023    Date of Discharge:  23    Procedures:  P CD           Service:  Obstetrics    Hospital Course:  Pt admitted with elevated BP and ruled in for Severe Pre-Eclampsia. She was induced and placed on Mag. Her labor stalled in progression and she delivered a VMI with Apgars 6/8 wt 6#10 via PCD. She completed 24 hr postpartum magnesium and her postoperative course was uncomplicated. Her BP was controlled on Nifedipine 30 XL bid. She was dc'd home on POD 3    Labs:    Lab Results (last 24 hours)       ** No results found for the last 24 hours. **          HOSPITAL LABS:  Lab Results   Component Value Date    WBC 15.38 (H) 2023    HGB 11.2 (L) 2023    HCT 33.9 (L) 2023    MCV 86.5 2023     2023    URICACID 4.5 2023    AST 16 2023    ALT 9 2023     (H) 2023     Results from last 7 days   Lab Units 23  1835   ABO TYPING  A   RH TYPING  Positive   ANTIBODY SCREEN  Negative       IMAGING        Discharge Medications     Discharge Medications        New Medications        Instructions Start Date   ibuprofen 600 MG tablet  Commonly known as: ADVIL,MOTRIN   600 mg, Oral, Every 6 Hours      NIFEdipine XL 30 MG 24 hr tablet  Commonly known as: PROCARDIA XL   30 mg, Oral, Every 12 Hours      oxyCODONE 5 MG immediate release tablet  Commonly known as: ROXICODONE   5 mg, Oral, Every 4 Hours PRN      Stool Softener 100 MG capsule  Generic drug: docusate sodium   100 mg, Oral, 2  Times Daily PRN             Continue These Medications        Instructions Start Date   cetirizine 10 MG tablet  Commonly known as: zyrTEC   No dose, route, or frequency recorded.      famotidine 10 MG tablet  Commonly known as: PEPCID   10 mg, Oral, Daily      prenatal vitamin 27-0.8 27-0.8 MG tablet tablet   1 tablet, Oral, Daily             Stop These Medications      Adderall XR 20 MG 24 hr capsule  Generic drug: amphetamine-dextroamphetamine XR     amphetamine-dextroamphetamine 10 MG tablet  Commonly known as: ADDERALL              Allergies:   Allergies   Allergen Reactions    Penicillins Anaphylaxis        Discharge Disposition:  To Home    Discharge Condition:  Stable    Discharge Diet: Regular    Activity at Discharge: pelvic rest, no lifitng    Follow-up Appointments: 1 wk for BP check        Connie Nguyen MD  08/24/23  13:00 EDT

## 2023-08-24 NOTE — PAYOR COMM NOTE
"Barbie CHOWDARY (23 y.o. Female) Notice of discharge  [id number]       Date of Birth   2000    Social Security Number       Address   [address] [place] [address]    Home Phone   [phone]    MRN   [id number]       Scientologist   [employer]    Marital Status                               Admission Date   23    Admission Type   Elective    Admitting Provider   Connie Nguyen MD    Attending Provider       Department, Room/Bed   [facility] MOTHER BABY 4B, N426/1       Discharge Date   2023    Discharge Disposition   Home or Self Care    Discharge Destination   Home                              Attending Provider: (none)   Allergies: Penicillins    Isolation: None   Infection: None   Code Status: Prior    Ht: 162.6 cm (64\")   Wt: 113 kg (249 lb)    Admission Cmt: None   Principal Problem: None                  Active Insurance as of 2023       Primary Coverage       Payor Plan Insurance Group Employer/Plan Group    ANTHEM BLUE CROSS ANTHEM BLUE CROSS BLUE SHIELD PPO [id number]       Payor Plan Address Payor Plan Phone Number Payor Plan Fax Number Effective Dates    [address] [phone]  2021 - None Entered    [place] [address]         Subscriber Name Subscriber Birth Date Member ID       BARBIE CHOWDARY 2000 [id number]                     Emergency Contacts            No emergency contacts on file.              Insurance Information                  ANTHEM BLUE CROSS/ANTHEM BLUE CROSS BLUE SHIELD PPO Phone: [phone]    Subscriber: Barbie Chowdary Subscriber#: [id number]    Group#: [id number] Precert#: --             Discharge Summary        Connie Nguyen MD at [address] [place] [address]  Discharge Summary      Patient: Barbie CHOWDARY            : 2000  MRN: [id number]  CSN: [id number]  Consult:   Consults       No orders found from 2023 to 2023.               Gestational Age at Discharge:  35w6d, " delivered      Admission  Diagnosis: Severe Pre-Eclampsia    Discharge Diagnosis:   Patient Active Problem List   Diagnosis    Preeclampsia, severe, third trimester    S/P primary low transverse        Date of Admission: 2023    Date of Discharge:  23    Procedures:  P CD           Service:  Obstetrics    Hospital Course:  Pt admitted with elevated BP and ruled in for Severe Pre-Eclampsia. She was induced and placed on Mag. Her labor stalled in progression and she delivered a VMI with Apgars 6/8 wt 6#10 via PCD. She completed 24 hr postpartum magnesium and her postoperative course was uncomplicated. Her BP was controlled on Nifedipine 30 XL bid. She was dc'd home on POD 3    Labs:    Lab Results (last 24 hours)       ** No results found for the last 24 hours. **          HOSPITAL LABS:  Lab Results   Component Value Date    WBC 15.38 (H) 2023    HGB 11.2 (L) 2023    HCT 33.9 (L) 2023    MCV 86.5 2023     2023    URICACID 4.5 2023    AST 16 2023    ALT 9 2023     (H) 2023     Results from last 7 days   Lab Units 23  1835   ABO TYPING  A   RH TYPING  Positive   ANTIBODY SCREEN  Negative       IMAGING        Discharge Medications     Discharge Medications        New Medications        Instructions Start Date   ibuprofen 600 MG tablet  Commonly known as: ADVIL,MOTRIN   600 mg, Oral, Every 6 Hours      NIFEdipine XL 30 MG 24 hr tablet  Commonly known as: PROCARDIA XL   30 mg, Oral, Every 12 Hours      oxyCODONE 5 MG immediate release tablet  Commonly known as: ROXICODONE   5 mg, Oral, Every 4 Hours PRN      Stool Softener 100 MG capsule  Generic drug: docusate sodium   100 mg, Oral, 2 Times Daily PRN             Continue These Medications        Instructions Start Date   cetirizine 10 MG tablet  Commonly known as: zyrTEC   No dose, route, or frequency recorded.      famotidine 10 MG tablet  Commonly known as: PEPCID   10 mg,  Oral, Daily      prenatal vitamin 27-0.8 27-0.8 MG tablet tablet   1 tablet, Oral, Daily             Stop These Medications      Adderall XR 20 MG 24 hr capsule  Generic drug: amphetamine-dextroamphetamine XR     amphetamine-dextroamphetamine 10 MG tablet  Commonly known as: ADDERALL              Allergies:   Allergies   Allergen Reactions    Penicillins Anaphylaxis        Discharge Disposition:  To Home    Discharge Condition:  Stable    Discharge Diet: Regular    Activity at Discharge: pelvic rest, no lifitng    Follow-up Appointments: 1 wk for BP check        Connie Nguyen MD  08/24/23  13:00 EDT      Electronically signed by Connie Nguyen MD at 08/24/23 1959

## 2023-08-24 NOTE — PROGRESS NOTES
[facility]  Obstetric Progress Note    Chief Complaint: POD 3    Subjective   Feeling well. Pain controlled. Breezy diet +amb. Lochia appr.     Objective     Vital Signs Range for the last 24 hours  Temp:  [97.5 øF (36.4 øC)-98.9 øF (37.2 øC)] 98.9 øF (37.2 øC)   BP: (124-156)/(75-90) 124/86   Heart Rate:  [] 100   Resp:  [16-18] 16   SpO2:  [97 %-99 %] 98 %               Intake/Output last 24 hours:      Intake/Output Summary (Last 24 hours) at 8/24/2023 1101  Last data filed at 8/24/2023 0000  Gross per 24 hour   Intake --   Output 1100 ml   Net -1100 ml       Intake/Output this shift:    No intake/output data recorded.    Physical Exam:  General: No acute distress   Heart RRR   Lungs CTAB     Abdomen Soft, appropriately tender, fundus firm, incision CDI   Extremities Exam of extremities: pedal edema 2 +       Laboratory Results:    WBC   Date Value Ref Range Status   08/22/2023 15.38 (H) 3.40 - 10.80 10*3/mm3 Final     RBC   Date Value Ref Range Status   08/22/2023 3.92 3.77 - 5.28 10*6/mm3 Final     Hemoglobin   Date Value Ref Range Status   08/22/2023 11.2 (L) 12.0 - 15.9 g/dL Final     Hematocrit   Date Value Ref Range Status   08/22/2023 33.9 (L) 34.0 - 46.6 % Final     MCV   Date Value Ref Range Status   08/22/2023 86.5 79.0 - 97.0 fL Final     MCH   Date Value Ref Range Status   08/22/2023 28.6 26.6 - 33.0 pg Final     MCHC   Date Value Ref Range Status   08/22/2023 33.0 31.5 - 35.7 g/dL Final     RDW   Date Value Ref Range Status   08/22/2023 14.6 12.3 - 15.4 % Final     RDW-SD   Date Value Ref Range Status   08/22/2023 45.4 37.0 - 54.0 fl Final     MPV   Date Value Ref Range Status   08/22/2023 9.9 6.0 - 12.0 fL Final     Platelets   Date Value Ref Range Status   08/22/2023 249 140 - 450 10*3/mm3 Final         Assessment/Plan: POD 3 s/p PCD  RPOC advance care  2. S Pre-E: Cont Nifedipine XL   3. Infant in NICU  4. DC home        Connie Nguyen MD  8/24/2023  11:01 EDT

## 2024-06-15 PROCEDURE — 87086 URINE CULTURE/COLONY COUNT: CPT | Performed by: NURSE PRACTITIONER

## 2025-04-27 ENCOUNTER — HOSPITAL ENCOUNTER (EMERGENCY)
Facility: HOSPITAL | Age: 25
Discharge: HOME | End: 2025-04-28
Attending: EMERGENCY MEDICINE | Admitting: EMERGENCY MEDICINE
Payer: COMMERCIAL

## 2025-04-27 DIAGNOSIS — K80.20 GALLSTONES: ICD-10-CM

## 2025-04-27 DIAGNOSIS — R10.11 RUQ PAIN: Primary | ICD-10-CM

## 2025-04-27 DIAGNOSIS — K80.50 BILIARY COLIC: ICD-10-CM

## 2025-04-27 PROCEDURE — 99285 EMERGENCY DEPT VISIT HI MDM: CPT

## 2025-04-27 RX ORDER — MORPHINE SULFATE 4 MG/ML
4 INJECTION, SOLUTION INTRAMUSCULAR; INTRAVENOUS ONCE
Status: COMPLETED | OUTPATIENT
Start: 2025-04-28 | End: 2025-04-28

## 2025-04-27 RX ORDER — SODIUM CHLORIDE 0.9 % (FLUSH) 0.9 %
10 SYRINGE (ML) INJECTION AS NEEDED
Status: DISCONTINUED | OUTPATIENT
Start: 2025-04-27 | End: 2025-04-28 | Stop reason: HOSPADM

## 2025-04-27 RX ORDER — ONDANSETRON 2 MG/ML
4 INJECTION INTRAMUSCULAR; INTRAVENOUS ONCE
Status: COMPLETED | OUTPATIENT
Start: 2025-04-28 | End: 2025-04-28

## 2025-04-27 NOTE — Clinical Note
[facility] EMERGENCY DEPARTMENT  [address]  [facility] [address]  Phone: [phone]    Barbie CHOWDARY was seen and treated in our emergency department on 4/27/2025.  She may return to work on 04/30/2025.         Thank you for choosing [facility].    Montse Nunez MD

## 2025-04-27 NOTE — Clinical Note
[facility] EMERGENCY DEPARTMENT  [address]  [facility] [address]  Phone: [phone]    Barbie CHOWDARY was seen and treated in our emergency department on 4/27/2025.  She may return to work on 04/30/2025.         Thank you for choosing [facility].    Montse Nunez MD

## 2025-04-28 ENCOUNTER — APPOINTMENT (OUTPATIENT)
Dept: CT IMAGING | Facility: HOSPITAL | Age: 25
End: 2025-04-28
Payer: COMMERCIAL

## 2025-04-28 VITALS
TEMPERATURE: 98.1 F | HEIGHT: 64 IN | SYSTOLIC BLOOD PRESSURE: 144 MMHG | HEART RATE: 86 BPM | DIASTOLIC BLOOD PRESSURE: 102 MMHG | WEIGHT: 192 LBS | OXYGEN SATURATION: 100 % | RESPIRATION RATE: 16 BRPM | BODY MASS INDEX: 32.78 KG/M2

## 2025-04-28 LAB
ALBUMIN SERPL-MCNC: 5.2 G/DL (ref 3.5–5.2)
ALBUMIN/GLOB SERPL: 1.4 G/DL
ALP SERPL-CCNC: 112 U/L (ref 39–117)
ALT SERPL W P-5'-P-CCNC: 14 U/L (ref 1–33)
ANION GAP SERPL CALCULATED.3IONS-SCNC: 15 MMOL/L (ref 5–15)
AST SERPL-CCNC: 19 U/L (ref 1–32)
BACTERIA UR QL AUTO: NORMAL /HPF
BASOPHILS # BLD AUTO: 0.06 10*3/MM3 (ref 0–0.2)
BASOPHILS NFR BLD AUTO: 0.4 % (ref 0–1.5)
BILIRUB SERPL-MCNC: 0.4 MG/DL (ref 0–1.2)
BILIRUB UR QL STRIP: NEGATIVE
BUN SERPL-MCNC: 8 MG/DL (ref 6–20)
BUN/CREAT SERPL: 13.6 (ref 7–25)
CALCIUM SPEC-SCNC: 9.7 MG/DL (ref 8.6–10.5)
CHLORIDE SERPL-SCNC: 98 MMOL/L (ref 98–107)
CLARITY UR: CLEAR
CO2 SERPL-SCNC: 24 MMOL/L (ref 22–29)
COLOR UR: YELLOW
CREAT SERPL-MCNC: 0.59 MG/DL (ref 0.57–1)
D-LACTATE SERPL-SCNC: 0.8 MMOL/L (ref 0.5–2)
DEPRECATED RDW RBC AUTO: 42.7 FL (ref 37–54)
EGFRCR SERPLBLD CKD-EPI 2021: 127.7 ML/MIN/1.73
EOSINOPHIL # BLD AUTO: 0.21 10*3/MM3 (ref 0–0.4)
EOSINOPHIL NFR BLD AUTO: 1.3 % (ref 0.3–6.2)
ERYTHROCYTE [DISTWIDTH] IN BLOOD BY AUTOMATED COUNT: 13.8 % (ref 12.3–15.4)
GLOBULIN UR ELPH-MCNC: 3.6 GM/DL
GLUCOSE SERPL-MCNC: 86 MG/DL (ref 65–99)
GLUCOSE UR STRIP-MCNC: NEGATIVE MG/DL
HCG INTACT+B SERPL-ACNC: <0.1 MIU/ML
HCT VFR BLD AUTO: 44.4 % (ref 34–46.6)
HGB BLD-MCNC: 14.4 G/DL (ref 12–15.9)
HGB UR QL STRIP.AUTO: NEGATIVE
HYALINE CASTS UR QL AUTO: NORMAL /LPF
IMM GRANULOCYTES # BLD AUTO: 0.04 10*3/MM3 (ref 0–0.05)
IMM GRANULOCYTES NFR BLD AUTO: 0.3 % (ref 0–0.5)
INR PPP: 0.94 (ref 0.89–1.12)
KETONES UR QL STRIP: ABNORMAL
LEUKOCYTE ESTERASE UR QL STRIP.AUTO: ABNORMAL
LIPASE SERPL-CCNC: 36 U/L (ref 13–60)
LYMPHOCYTES # BLD AUTO: 2.41 10*3/MM3 (ref 0.7–3.1)
LYMPHOCYTES NFR BLD AUTO: 15.3 % (ref 19.6–45.3)
MCH RBC QN AUTO: 27.4 PG (ref 26.6–33)
MCHC RBC AUTO-ENTMCNC: 32.4 G/DL (ref 31.5–35.7)
MCV RBC AUTO: 84.4 FL (ref 79–97)
MONOCYTES # BLD AUTO: 0.61 10*3/MM3 (ref 0.1–0.9)
MONOCYTES NFR BLD AUTO: 3.9 % (ref 5–12)
NEUTROPHILS NFR BLD AUTO: 12.44 10*3/MM3 (ref 1.7–7)
NEUTROPHILS NFR BLD AUTO: 78.8 % (ref 42.7–76)
NITRITE UR QL STRIP: NEGATIVE
NRBC BLD AUTO-RTO: 0 /100 WBC (ref 0–0.2)
PH UR STRIP.AUTO: 6.5 [PH] (ref 5–8)
PLATELET # BLD AUTO: 371 10*3/MM3 (ref 140–450)
PMV BLD AUTO: 9.4 FL (ref 6–12)
POTASSIUM SERPL-SCNC: 3.8 MMOL/L (ref 3.5–5.2)
PROCALCITONIN SERPL-MCNC: 0.03 NG/ML (ref 0–0.25)
PROT SERPL-MCNC: 8.8 G/DL (ref 6–8.5)
PROT UR QL STRIP: ABNORMAL
PROTHROMBIN TIME: 13.1 SECONDS (ref 12.2–15.3)
RBC # BLD AUTO: 5.26 10*6/MM3 (ref 3.77–5.28)
RBC # UR STRIP: NORMAL /HPF
REF LAB TEST METHOD: NORMAL
SODIUM SERPL-SCNC: 137 MMOL/L (ref 136–145)
SP GR UR STRIP: 1.03 (ref 1–1.03)
SQUAMOUS #/AREA URNS HPF: NORMAL /HPF
UROBILINOGEN UR QL STRIP: ABNORMAL
WBC # UR STRIP: NORMAL /HPF
WBC NRBC COR # BLD AUTO: 15.77 10*3/MM3 (ref 3.4–10.8)

## 2025-04-28 PROCEDURE — 25810000003 SODIUM CHLORIDE 0.9 % SOLUTION: Performed by: EMERGENCY MEDICINE

## 2025-04-28 PROCEDURE — 84145 PROCALCITONIN (PCT): CPT | Performed by: EMERGENCY MEDICINE

## 2025-04-28 PROCEDURE — 96375 TX/PRO/DX INJ NEW DRUG ADDON: CPT

## 2025-04-28 PROCEDURE — 81001 URINALYSIS AUTO W/SCOPE: CPT | Performed by: EMERGENCY MEDICINE

## 2025-04-28 PROCEDURE — 83605 ASSAY OF LACTIC ACID: CPT | Performed by: EMERGENCY MEDICINE

## 2025-04-28 PROCEDURE — 85025 COMPLETE CBC W/AUTO DIFF WBC: CPT | Performed by: EMERGENCY MEDICINE

## 2025-04-28 PROCEDURE — 85610 PROTHROMBIN TIME: CPT | Performed by: EMERGENCY MEDICINE

## 2025-04-28 PROCEDURE — 25510000001 IOPAMIDOL 61 % SOLUTION: Performed by: EMERGENCY MEDICINE

## 2025-04-28 PROCEDURE — 83690 ASSAY OF LIPASE: CPT | Performed by: EMERGENCY MEDICINE

## 2025-04-28 PROCEDURE — 80053 COMPREHEN METABOLIC PANEL: CPT | Performed by: EMERGENCY MEDICINE

## 2025-04-28 PROCEDURE — 74177 CT ABD & PELVIS W/CONTRAST: CPT

## 2025-04-28 PROCEDURE — 25010000002 ONDANSETRON PER 1 MG: Performed by: EMERGENCY MEDICINE

## 2025-04-28 PROCEDURE — 96374 THER/PROPH/DIAG INJ IV PUSH: CPT

## 2025-04-28 PROCEDURE — 84702 CHORIONIC GONADOTROPIN TEST: CPT | Performed by: EMERGENCY MEDICINE

## 2025-04-28 PROCEDURE — 25010000002 MORPHINE PER 10 MG: Performed by: EMERGENCY MEDICINE

## 2025-04-28 RX ORDER — IOPAMIDOL 612 MG/ML
75 INJECTION, SOLUTION INTRAVASCULAR
Status: COMPLETED | OUTPATIENT
Start: 2025-04-28 | End: 2025-04-28

## 2025-04-28 RX ORDER — ONDANSETRON 4 MG/1
4 TABLET, FILM COATED ORAL EVERY 6 HOURS PRN
Qty: 20 TABLET | Refills: 0 | Status: SHIPPED | OUTPATIENT
Start: 2025-04-28 | End: 2025-05-03

## 2025-04-28 RX ADMIN — MORPHINE SULFATE 4 MG: 4 INJECTION, SOLUTION INTRAMUSCULAR; INTRAVENOUS at 00:58

## 2025-04-28 RX ADMIN — SODIUM CHLORIDE 1000 ML: 9 INJECTION, SOLUTION INTRAVENOUS at 00:59

## 2025-04-28 RX ADMIN — IOPAMIDOL 75 ML: 612 INJECTION, SOLUTION INTRAVENOUS at 02:04

## 2025-04-28 RX ADMIN — ONDANSETRON 4 MG: 2 INJECTION INTRAMUSCULAR; INTRAVENOUS at 00:57

## 2025-04-28 NOTE — ED PROVIDER NOTES
EMERGENCY DEPARTMENT ENCOUNTER    Pt Name: Barbie CHOWDARY  MRN: [id number]  Pt :   2000  Room Number:  16/16  Date of encounter:  2025  PCP: Yordy Lock MD  ED Provider: JIMBO Lee    Historian: Patient, significant other      HPI:  Chief Complaint: Right upper quadrant pain, back pain        Context: Barbie CHOWDARY is a 24 y.o. female who presents to the ED c/o right upper quadrant pain and back pain since this evening.  Reports history of gallstone, scheduled for cholecystectomy with [facility] on May 2.  States around 7 PM she started to experience sharp pain at the right upper quadrant radiating to the mid back.  Reports nausea, no vomiting.  No fever.  No dysuria.  Denies light stools but thinks urine was slightly darker than usual.      PAST MEDICAL HISTORY  Past Medical History:   Diagnosis Date    ADHD     GERD (gastroesophageal reflux disease)     Obese     Preeclampsia     Seasonal allergies          PAST SURGICAL HISTORY  Past Surgical History:   Procedure Laterality Date     SECTION N/A 2023    Procedure:  SECTION PRIMARY;  Surgeon: Kimberly Cervantes MD;  Location: [facility] LABOR DELIVERY;  Service: Obstetrics;  Laterality: N/A;         FAMILY HISTORY  History reviewed. No pertinent family history.      SOCIAL HISTORY  Social History     Socioeconomic History    Marital status:     Highest education level: Associate degree: academic program   Tobacco Use    Smoking status: Never     Passive exposure: Current    Smokeless tobacco: Never   Vaping Use    Vaping status: Former    Quit date: 2023    Substances: Nicotine    Devices: Disposable   Substance and Sexual Activity    Alcohol use: Not Currently    Drug use: Never    Sexual activity: Defer         ALLERGIES  Penicillins        REVIEW OF SYSTEMS  Review of Systems       All systems reviewed and negative except for those discussed in HPI.       PHYSICAL EXAM    I have reviewed  the triage vital signs and nursing notes.    ED Triage Vitals [04/27/25 2306]   Temp Heart Rate Resp BP SpO2   98.1 °F (36.7 °C) 92 16 157/98 100 %      Temp src Heart Rate Source Patient Position BP Location FiO2 (%)   Oral Monitor Sitting Right arm --       Physical Exam  GENERAL:   Appears in no acute distress.  Obese  HENT: Nares patent.  EYES: No scleral icterus.  CV: Regular rhythm, regular rate.  RESPIRATORY: Normal effort.  No audible wheezes, rales or rhonchi.  ABDOMEN: Soft, tender right upper quadrant with voluntary guarding, no rigidity  MUSCULOSKELETAL: No deformities.   NEURO: Alert, moves all extremities, follows commands.  SKIN: Warm, dry, no rash visualized.      LAB RESULTS  Recent Results (from the past 24 hours)   Urinalysis With Culture If Indicated - Urine, Clean Catch    Collection Time: 04/28/25 12:05 AM    Specimen: Urine, Clean Catch   Result Value Ref Range    Color, UA Yellow Yellow, Straw    Appearance, UA Clear Clear    pH, UA 6.5 5.0 - 8.0    Specific Gravity, UA 1.026 1.001 - 1.030    Glucose, UA Negative Negative    Ketones, UA 40 mg/dL (2+) (A) Negative    Bilirubin, UA Negative Negative    Blood, UA Negative Negative    Protein, UA Trace (A) Negative    Leuk Esterase, UA Trace (A) Negative    Nitrite, UA Negative Negative    Urobilinogen, UA 1.0 E.U./dL 0.2 - 1.0 E.U./dL   Urinalysis, Microscopic Only - Urine, Clean Catch    Collection Time: 04/28/25 12:05 AM    Specimen: Urine, Clean Catch   Result Value Ref Range    RBC, UA 0-2 None Seen, 0-2 /HPF    WBC, UA 0-2 None Seen, 0-2 /HPF    Bacteria, UA Trace None Seen, Trace /HPF    Squamous Epithelial Cells, UA 0-2 None Seen, 0-2 /HPF    Hyaline Casts, UA 0-6 0 - 6 /LPF    Methodology Automated Microscopy    Comprehensive Metabolic Panel    Collection Time: 04/28/25  1:03 AM    Specimen: Blood   Result Value Ref Range    Glucose 86 65 - 99 mg/dL    BUN 8 6 - 20 mg/dL    Creatinine 0.59 0.57 - 1.00 mg/dL    Sodium 137 136 - 145 mmol/L     Potassium 3.8 3.5 - 5.2 mmol/L    Chloride 98 98 - 107 mmol/L    CO2 24.0 22.0 - 29.0 mmol/L    Calcium 9.7 8.6 - 10.5 mg/dL    Total Protein 8.8 (H) 6.0 - 8.5 g/dL    Albumin 5.2 3.5 - 5.2 g/dL    ALT (SGPT) 14 1 - 33 U/L    AST (SGOT) 19 1 - 32 U/L    Alkaline Phosphatase 112 39 - 117 U/L    Total Bilirubin 0.4 0.0 - 1.2 mg/dL    Globulin 3.6 gm/dL    A/G Ratio 1.4 g/dL    BUN/Creatinine Ratio 13.6 7.0 - 25.0    Anion Gap 15.0 5.0 - 15.0 mmol/L    eGFR 127.7 >60.0 mL/min/1.73   hCG, Quantitative, Pregnancy    Collection Time: 04/28/25  1:03 AM    Specimen: Blood   Result Value Ref Range    HCG Quantitative <0.10 mIU/mL   Lactic Acid, Plasma    Collection Time: 04/28/25  1:03 AM    Specimen: Blood   Result Value Ref Range    Lactate 0.8 0.5 - 2.0 mmol/L   Protime-INR    Collection Time: 04/28/25  1:03 AM    Specimen: Blood   Result Value Ref Range    Protime 13.1 12.2 - 15.3 Seconds    INR 0.94 0.89 - 1.12   Procalcitonin    Collection Time: 04/28/25  1:03 AM    Specimen: Blood   Result Value Ref Range    Procalcitonin 0.03 0.00 - 0.25 ng/mL   Lipase    Collection Time: 04/28/25  1:03 AM    Specimen: Blood   Result Value Ref Range    Lipase 36 13 - 60 U/L   CBC Auto Differential    Collection Time: 04/28/25  1:03 AM    Specimen: Blood   Result Value Ref Range    WBC 15.77 (H) 3.40 - 10.80 10*3/mm3    RBC 5.26 3.77 - 5.28 10*6/mm3    Hemoglobin 14.4 12.0 - 15.9 g/dL    Hematocrit 44.4 34.0 - 46.6 %    MCV 84.4 79.0 - 97.0 fL    MCH 27.4 26.6 - 33.0 pg    MCHC 32.4 31.5 - 35.7 g/dL    RDW 13.8 12.3 - 15.4 %    RDW-SD 42.7 37.0 - 54.0 fl    MPV 9.4 6.0 - 12.0 fL    Platelets 371 140 - 450 10*3/mm3    Neutrophil % 78.8 (H) 42.7 - 76.0 %    Lymphocyte % 15.3 (L) 19.6 - 45.3 %    Monocyte % 3.9 (L) 5.0 - 12.0 %    Eosinophil % 1.3 0.3 - 6.2 %    Basophil % 0.4 0.0 - 1.5 %    Immature Grans % 0.3 0.0 - 0.5 %    Neutrophils, Absolute 12.44 (H) 1.70 - 7.00 10*3/mm3    Lymphocytes, Absolute 2.41 0.70 - 3.10 10*3/mm3     Monocytes, Absolute 0.61 0.10 - 0.90 10*3/mm3    Eosinophils, Absolute 0.21 0.00 - 0.40 10*3/mm3    Basophils, Absolute 0.06 0.00 - 0.20 10*3/mm3    Immature Grans, Absolute 0.04 0.00 - 0.05 10*3/mm3    nRBC 0.0 0.0 - 0.2 /100 WBC       If labs were ordered, I independently reviewed the results and considered them in treating the patient.        RADIOLOGY  CT Abdomen Pelvis With Contrast  Result Date: 4/28/2025  CT ABDOMEN PELVIS W CONTRAST Date of Exam: 4/28/2025 1:53 AM EDT Indication: abd pain. Comparison: None available. Technique: Axial CT images were obtained of the abdomen and pelvis following the uneventful intravenous administration of 75 mL Isovue-300. Reconstructed coronal and sagittal images were also obtained. Automated exposure control and iterative construction methods were used. Findings: The liver appears normal in size without focal abnormality. Spleen is normal size and appears homogeneous.  Stomach is nondistended.  No adrenal mass.  Kidneys appear unremarkable.  No hydronephrosis.  Urinary bladder is within normal limits.  There is no bowel distention.  No pneumatosis intestinalis, pneumoperitoneum or lymphadenopathy.  No significant ascites.  The appendix appears within normal limits.  Gallbladder is nondistended.  Biliary tree is nondistended.  The pancreas appears homogeneous.  Abdominal vasculature is within normal limits. The uterus and ovaries appear normal size. The lung bases are clear. The heart size is normal. Distal esophagus is unremarkable.Subcutaneous fat and underlying musculature appear within normal limits.  There are no acute osseous abnormalities or destructive bone lesions.     Impression: No acute abdominal or pelvic abnormality. Electronically Signed: Farshad Gonzales MD  4/28/2025 2:34 AM EDT  Workstation ID: [id number]      I ordered and independently reviewed the above noted radiographic studies.      I viewed images of CT abdomen pelvis which showed no acute abnormality per  my independent interpretation.    See radiologist's dictation for official interpretation.        PROCEDURES    Procedures    No orders to display       MEDICATIONS GIVEN IN ER    Medications   sodium chloride 0.9 % flush 10 mL (has no administration in time range)   sodium chloride 0.9 % bolus 1,000 mL (0 mL Intravenous Stopped 4/28/25 0325)   Morphine sulfate (PF) injection 4 mg (4 mg Intravenous Given 4/28/25 0058)   ondansetron (ZOFRAN) injection 4 mg (4 mg Intravenous Given 4/28/25 0057)   iopamidol (ISOVUE-300) 61 % injection 75 mL (75 mL Intravenous Given 4/28/25 0204)         MEDICAL DECISION MAKING, PROGRESS, and CONSULTS    All labs, if obtained, have been independently reviewed by me.  All radiology studies, if obtained, have been reviewed by me and the radiologist dictating the report.  All EKG's, if obtained, have been independently viewed and interpreted by me/my attending physician.      Discussion below represents my analysis of pertinent findings related to patient's condition, differential diagnosis, treatment plan and final disposition.  Patient is a 24-year-old female with history of gallstones scheduled for cholecystectomy with [facility] in 5 days presented for evaluation of right upper quadrant pain and nausea.  She told me that she usually had nausea and diarrhea and mild pain, never as severe as today.  On physical exam she was uncomfortable appearing, tender on palpation right upper quadrant, vitals were stable, no fever, slightly hypertensive, lab work was significant for leukocytosis 15.77, normal lactate and procalcitonin, normal liver enzymes and bilirubin, CT abdomen pelvis showed no acute abdominal pelvic abnormality.  Not able to obtain ultrasound right upper quadrant due to unavailability of services during the night.  Patient received morphine and Zofran IV.  She felt significantly better.  Her pain was nearly gone.  No indication for admission today, patient comfortable  going home and continue with scheduled procedure on Friday.  We discussed return precautions for any new or worsening.  Patient voiced understanding, sent home with antiemetics per her request                    Differential diagnosis:    Cholelithiasis, choledocholithiasis, cholecystitis, nephrolithiasis, urolithiasis      Additional sources:    - Discussed/ obtained information from independent historians: Significant other    - External (non-ED) record review: 3/31/2025, office visit with Dr. Josh Means on cholelithiasis without obstruction    - Chronic or social conditions impacting care: Obesity    - Shared decision making: Patient      Orders placed during this visit:  Orders Placed This Encounter   Procedures    CT Abdomen Pelvis With Contrast    Comprehensive Metabolic Panel    hCG, Quantitative, Pregnancy    Lactic Acid, Plasma    Protime-INR    Procalcitonin    Lipase    Urinalysis With Culture If Indicated - Urine, Clean Catch    CBC Auto Differential    Urinalysis, Microscopic Only - Urine, Clean Catch    Insert Peripheral IV    CBC & Differential         Additional orders considered but not ordered:  Ultrasound right upper quadrant    ED Course:    Consultants:      ED Course as of 04/28/25 0341   Mon Apr 28, 2025   0253 I reevaluated the patient, she feels much better, she is nearly pain-free.  Discussed lab work results and the CT imaging.  Patient is comfortable with going home  [IR]      ED Course User Index  [IR] Trudi Loja, APRN              Shared Decision Making:  After my consideration of clinical presentation and any laboratory/radiology studies obtained, I discussed the findings with the patient/patient representative who is in agreement with the treatment plan and the final disposition.   Risks and benefits of discharge and/or observation/admission were discussed.       AS OF 03:41 EDT VITALS:    BP - (!) 144/102  HR - 86  TEMP - 98.1 °F (36.7 °C) (Oral)  O2 SATS - 100%                   DIAGNOSIS  Final diagnoses:   RUQ pain   Biliary colic   Gallstones         DISPOSITION  DISCHARGE    Patient discharged in stable condition.    Reviewed implications of results, diagnosis, meds, responsibility to follow up, warning signs and symptoms of possible worsening, potential complications and reasons to return to ER.    Patient/Family voiced understanding of above instructions.    Discussed plan for discharge, as there is no emergent indication for admission.  Pt/family is agreeable and understands need for follow up and possible repeat testing.  Pt/family is aware that discharge does not mean that nothing is wrong but that it indicates no emergency is currently present that requires admission and they must continue care with follow-up as given below or with a physician of their choice.     FOLLOW-UP  [facility] EMERGENCY DEPARTMENT  [address] [id number] [phone]        Yordy Lock MD  [address] [place], [place] [id number] [phone]               Medication List        New Prescriptions      ondansetron 4 MG tablet  Commonly known as: ZOFRAN  Take 1 tablet by mouth Every 6 (Six) Hours As Needed for Nausea or Vomiting for up to 5 days.               Where to Get Your Medications        These medications were sent to [facility]/pharmacy #[id number] - [place], [place] - [address] AT [facility] - [phone]  - [phone] FX  [address] [facility] [facility] [address]      Phone: [phone]   ondansetron 4 MG tablet            Please note that portions of this document were completed with voice recognition software.     JIMBO Lee   04/28/25   03:41 EDT        Trudi Loja APRN  04/28/25 [id number]

## 2025-08-14 ENCOUNTER — TRANSCRIBE ORDERS (OUTPATIENT)
Dept: LAB | Facility: HOSPITAL | Age: 25
End: 2025-08-14
Payer: COMMERCIAL

## 2025-08-14 ENCOUNTER — LAB (OUTPATIENT)
Dept: LAB | Facility: HOSPITAL | Age: 25
End: 2025-08-14
Payer: COMMERCIAL

## 2025-08-14 DIAGNOSIS — Z34.01 ENCOUNTER FOR SUPERVISION OF NORMAL FIRST PREGNANCY IN FIRST TRIMESTER: ICD-10-CM

## 2025-08-14 DIAGNOSIS — Z32.01 PREGNANCY EXAMINATION OR TEST, POSITIVE RESULT: ICD-10-CM

## 2025-08-14 DIAGNOSIS — Z32.01 PREGNANCY EXAMINATION OR TEST, POSITIVE RESULT: Primary | ICD-10-CM

## 2025-08-14 LAB
ABO GROUP BLD: NORMAL
ALBUMIN SERPL-MCNC: 4.6 G/DL (ref 3.5–5.2)
ALBUMIN/GLOB SERPL: 1.4 G/DL
ALP SERPL-CCNC: 97 U/L (ref 39–117)
ALT SERPL W P-5'-P-CCNC: 12 U/L (ref 1–33)
AMPHET+METHAMPHET UR QL: POSITIVE
AMPHETAMINES UR QL: NEGATIVE
ANION GAP SERPL CALCULATED.3IONS-SCNC: 13.1 MMOL/L (ref 5–15)
AST SERPL-CCNC: 15 U/L (ref 1–32)
BARBITURATES UR QL SCN: NEGATIVE
BENZODIAZ UR QL SCN: NEGATIVE
BILIRUB SERPL-MCNC: 0.3 MG/DL (ref 0–1.2)
BLD GP AB SCN SERPL QL: NEGATIVE
BUN SERPL-MCNC: 5 MG/DL (ref 6–20)
BUN/CREAT SERPL: 9.6 (ref 7–25)
BUPRENORPHINE SERPL-MCNC: NEGATIVE NG/ML
CALCIUM SPEC-SCNC: 9.6 MG/DL (ref 8.6–10.5)
CANNABINOIDS SERPL QL: NEGATIVE
CHLORIDE SERPL-SCNC: 101 MMOL/L (ref 98–107)
CO2 SERPL-SCNC: 22.9 MMOL/L (ref 22–29)
COCAINE UR QL: NEGATIVE
CREAT SERPL-MCNC: 0.52 MG/DL (ref 0.57–1)
CREAT UR-MCNC: 60.2 MG/DL
DEPRECATED RDW RBC AUTO: 44.5 FL (ref 37–54)
EGFRCR SERPLBLD CKD-EPI 2021: 130.8 ML/MIN/1.73
ERYTHROCYTE [DISTWIDTH] IN BLOOD BY AUTOMATED COUNT: 13.9 % (ref 12.3–15.4)
FENTANYL UR-MCNC: NEGATIVE NG/ML
GLOBULIN UR ELPH-MCNC: 3.2 GM/DL
GLUCOSE SERPL-MCNC: 81 MG/DL (ref 65–99)
HBV SURFACE AG SERPL QL IA: NORMAL
HCT VFR BLD AUTO: 42.5 % (ref 34–46.6)
HCV AB SER QL: NORMAL
HGB BLD-MCNC: 13.7 G/DL (ref 12–15.9)
HIV 1+2 AB+HIV1 P24 AG SERPL QL IA: NORMAL
LDH SERPL-CCNC: 172 U/L (ref 135–214)
MCH RBC QN AUTO: 28.5 PG (ref 26.6–33)
MCHC RBC AUTO-ENTMCNC: 32.2 G/DL (ref 31.5–35.7)
MCV RBC AUTO: 88.4 FL (ref 79–97)
METHADONE UR QL SCN: NEGATIVE
OPIATES UR QL: NEGATIVE
OXYCODONE UR QL SCN: NEGATIVE
PCP UR QL SCN: NEGATIVE
PLATELET # BLD AUTO: 306 10*3/MM3 (ref 140–450)
PMV BLD AUTO: 9.8 FL (ref 6–12)
POTASSIUM SERPL-SCNC: 3.5 MMOL/L (ref 3.5–5.2)
PROT ?TM UR-MCNC: 6.1 MG/DL
PROT SERPL-MCNC: 7.8 G/DL (ref 6–8.5)
PROT/CREAT UR: 101.3 MG/G CREA (ref 0–200)
RBC # BLD AUTO: 4.81 10*6/MM3 (ref 3.77–5.28)
RH BLD: POSITIVE
SODIUM SERPL-SCNC: 137 MMOL/L (ref 136–145)
TREPONEMA PALLIDUM IGG+IGM AB [PRESENCE] IN SERUM OR PLASMA BY IMMUNOASSAY: NORMAL
TRICYCLICS UR QL SCN: NEGATIVE
URATE SERPL-MCNC: 3.1 MG/DL (ref 2.4–5.7)
WBC NRBC COR # BLD AUTO: 11.74 10*3/MM3 (ref 3.4–10.8)

## 2025-08-14 PROCEDURE — 82570 ASSAY OF URINE CREATININE: CPT

## 2025-08-14 PROCEDURE — 86850 RBC ANTIBODY SCREEN: CPT

## 2025-08-14 PROCEDURE — 84156 ASSAY OF PROTEIN URINE: CPT

## 2025-08-14 PROCEDURE — 86762 RUBELLA ANTIBODY: CPT

## 2025-08-14 PROCEDURE — 84550 ASSAY OF BLOOD/URIC ACID: CPT

## 2025-08-14 PROCEDURE — 83615 LACTATE (LD) (LDH) ENZYME: CPT

## 2025-08-14 PROCEDURE — 86803 HEPATITIS C AB TEST: CPT

## 2025-08-14 PROCEDURE — G0432 EIA HIV-1/HIV-2 SCREEN: HCPCS

## 2025-08-14 PROCEDURE — 85027 COMPLETE CBC AUTOMATED: CPT

## 2025-08-14 PROCEDURE — 86780 TREPONEMA PALLIDUM: CPT

## 2025-08-14 PROCEDURE — 86901 BLOOD TYPING SEROLOGIC RH(D): CPT

## 2025-08-14 PROCEDURE — 80053 COMPREHEN METABOLIC PANEL: CPT

## 2025-08-14 PROCEDURE — 87340 HEPATITIS B SURFACE AG IA: CPT

## 2025-08-14 PROCEDURE — 86900 BLOOD TYPING SEROLOGIC ABO: CPT

## 2025-08-14 PROCEDURE — 36415 COLL VENOUS BLD VENIPUNCTURE: CPT

## 2025-08-14 PROCEDURE — 80307 DRUG TEST PRSMV CHEM ANLYZR: CPT

## 2025-08-14 PROCEDURE — 87086 URINE CULTURE/COLONY COUNT: CPT

## 2025-08-15 LAB
BACTERIA SPEC AEROBE CULT: NO GROWTH
RUBV IGG SERPL IA-ACNC: 4.09 INDEX

## (undated) DEVICE — TBG PENCL TELESCP MEGADYNE SMOKE EVAC 10FT

## (undated) DEVICE — COATED VICRYL  (POLYGLACTIN 910) SUTURE, VIOLET BRAIDED, STERILE, SYNTHETIC ABSORBABLE SUTURE: Brand: COATED VICRYL

## (undated) DEVICE — SUT PLAIN  3/0 CT1 27IN 842H

## (undated) DEVICE — TRAP FLD MINIVAC MEGADYNE 100ML

## (undated) DEVICE — PATIENT RETURN ELECTRODE, SINGLE-USE, CONTACT QUALITY MONITORING, ADULT, WITH 9FT CORD, FOR PATIENTS WEIGING OVER 33LBS. (15KG): Brand: MEGADYNE

## (undated) DEVICE — SUT PDS 0 CTX 36IN DYED Z370T

## (undated) DEVICE — CLTH CLENS READYCLEANSE PERI CARE PK/5

## (undated) DEVICE — APPL CHLORAPREP TINTED 26ML TEAL

## (undated) DEVICE — SOL IRR NACL 0.9PCT BT 1000ML

## (undated) DEVICE — SOL IRR H2O BTL 1000ML STRL

## (undated) DEVICE — GLV SURG BIOGEL LTX PF 6

## (undated) DEVICE — TRY SPINE BLCK WHITACRE 25G 3X5IN

## (undated) DEVICE — ADHS SKIN PREMIERPRO EXOFIN TOPICAL HI/VISC .5ML

## (undated) DEVICE — MAT PREVALON MOBL TRANSFR AIR W/PAD REPROC 39X81IN

## (undated) DEVICE — BOWL UTIL STRL 32OZ

## (undated) DEVICE — SUT VIC 2/0 CT1 27IN J339H BX/36

## (undated) DEVICE — SUT MONOCRYL SZ 4 0 18IN PS1 Y682H BX/36

## (undated) DEVICE — PK C/SECT 10

## (undated) DEVICE — SUT MNCRYL 1/0 CT1 36IN Y947H BX/36

## (undated) DEVICE — 4-PORT MANIFOLD: Brand: NEPTUNE 2